# Patient Record
Sex: MALE | Race: OTHER | HISPANIC OR LATINO | Employment: FULL TIME | URBAN - METROPOLITAN AREA
[De-identification: names, ages, dates, MRNs, and addresses within clinical notes are randomized per-mention and may not be internally consistent; named-entity substitution may affect disease eponyms.]

---

## 2021-12-15 ENCOUNTER — APPOINTMENT (EMERGENCY)
Dept: RADIOLOGY | Facility: HOSPITAL | Age: 21
End: 2021-12-15

## 2021-12-15 ENCOUNTER — HOSPITAL ENCOUNTER (EMERGENCY)
Facility: HOSPITAL | Age: 21
Discharge: HOME | End: 2021-12-15
Attending: EMERGENCY MEDICINE
Payer: OTHER MISCELLANEOUS

## 2021-12-15 VITALS
OXYGEN SATURATION: 100 % | HEART RATE: 62 BPM | WEIGHT: 288.8 LBS | RESPIRATION RATE: 16 BRPM | TEMPERATURE: 98.6 F | SYSTOLIC BLOOD PRESSURE: 184 MMHG | DIASTOLIC BLOOD PRESSURE: 77 MMHG

## 2021-12-15 DIAGNOSIS — M79.672 FOOT PAIN, LEFT: Primary | ICD-10-CM

## 2021-12-15 PROCEDURE — 99284 EMERGENCY DEPT VISIT MOD MDM: CPT | Performed by: EMERGENCY MEDICINE

## 2021-12-15 PROCEDURE — 73630 X-RAY EXAM OF FOOT: CPT

## 2021-12-15 PROCEDURE — 99283 EMERGENCY DEPT VISIT LOW MDM: CPT

## 2021-12-15 RX ORDER — IBUPROFEN 600 MG/1
600 TABLET ORAL EVERY 6 HOURS PRN
Qty: 20 TABLET | Refills: 0 | Status: SHIPPED | OUTPATIENT
Start: 2021-12-15

## 2023-03-27 ENCOUNTER — APPOINTMENT (EMERGENCY)
Dept: CT IMAGING | Facility: HOSPITAL | Age: 23
End: 2023-03-27

## 2023-03-27 ENCOUNTER — HOSPITAL ENCOUNTER (EMERGENCY)
Facility: HOSPITAL | Age: 23
Discharge: HOME | End: 2023-03-27
Attending: EMERGENCY MEDICINE | Admitting: EMERGENCY MEDICINE

## 2023-03-27 ENCOUNTER — APPOINTMENT (EMERGENCY)
Dept: ULTRASOUND IMAGING | Facility: HOSPITAL | Age: 23
End: 2023-03-27

## 2023-03-27 VITALS
TEMPERATURE: 97.7 F | SYSTOLIC BLOOD PRESSURE: 117 MMHG | HEART RATE: 58 BPM | RESPIRATION RATE: 16 BRPM | OXYGEN SATURATION: 100 % | WEIGHT: 270.73 LBS | DIASTOLIC BLOOD PRESSURE: 70 MMHG

## 2023-03-27 DIAGNOSIS — R10.9 ABDOMINAL PAIN: Primary | ICD-10-CM

## 2023-03-27 LAB
ALBUMIN SERPL BCP-MCNC: 4.4 G/DL (ref 3.5–5)
ALP SERPL-CCNC: 68 U/L (ref 34–104)
ALT SERPL W P-5'-P-CCNC: 19 U/L (ref 7–52)
ANION GAP SERPL CALCULATED.3IONS-SCNC: 5 MMOL/L (ref 4–13)
AST SERPL W P-5'-P-CCNC: 17 U/L (ref 13–39)
BACTERIA UR QL AUTO: ABNORMAL /HPF
BASOPHILS # BLD AUTO: 0.1 THOUSANDS/ÂΜL (ref 0–0.1)
BASOPHILS NFR BLD AUTO: 1 % (ref 0–1)
BILIRUB SERPL-MCNC: 0.35 MG/DL (ref 0.2–1)
BILIRUB UR QL STRIP: ABNORMAL
BUN SERPL-MCNC: 13 MG/DL (ref 5–25)
CALCIUM SERPL-MCNC: 9.6 MG/DL (ref 8.4–10.2)
CHLORIDE SERPL-SCNC: 106 MMOL/L (ref 96–108)
CLARITY UR: ABNORMAL
CO2 SERPL-SCNC: 26 MMOL/L (ref 21–32)
COLOR UR: YELLOW
CREAT SERPL-MCNC: 0.92 MG/DL (ref 0.6–1.3)
EOSINOPHIL # BLD AUTO: 0.17 THOUSAND/ÂΜL (ref 0–0.61)
EOSINOPHIL NFR BLD AUTO: 2 % (ref 0–6)
ERYTHROCYTE [DISTWIDTH] IN BLOOD BY AUTOMATED COUNT: 12.2 % (ref 11.6–15.1)
GFR SERPL CREATININE-BSD FRML MDRD: 116 ML/MIN/1.73SQ M
GLUCOSE SERPL-MCNC: 95 MG/DL (ref 65–140)
GLUCOSE UR STRIP-MCNC: NEGATIVE MG/DL
HCT VFR BLD AUTO: 47.9 % (ref 36.5–49.3)
HGB BLD-MCNC: 15.4 G/DL (ref 12–17)
HGB UR QL STRIP.AUTO: ABNORMAL
IMM GRANULOCYTES # BLD AUTO: 0.03 THOUSAND/UL (ref 0–0.2)
IMM GRANULOCYTES NFR BLD AUTO: 0 % (ref 0–2)
KETONES UR STRIP-MCNC: NEGATIVE MG/DL
LEUKOCYTE ESTERASE UR QL STRIP: NEGATIVE
LIPASE SERPL-CCNC: 182 U/L (ref 11–82)
LYMPHOCYTES # BLD AUTO: 2.83 THOUSANDS/ÂΜL (ref 0.6–4.47)
LYMPHOCYTES NFR BLD AUTO: 35 % (ref 14–44)
MCH RBC QN AUTO: 28.7 PG (ref 26.8–34.3)
MCHC RBC AUTO-ENTMCNC: 32.2 G/DL (ref 31.4–37.4)
MCV RBC AUTO: 89 FL (ref 82–98)
MONOCYTES # BLD AUTO: 0.58 THOUSAND/ÂΜL (ref 0.17–1.22)
MONOCYTES NFR BLD AUTO: 7 % (ref 4–12)
MUCOUS THREADS UR QL AUTO: ABNORMAL
NEUTROPHILS # BLD AUTO: 4.33 THOUSANDS/ÂΜL (ref 1.85–7.62)
NEUTS SEG NFR BLD AUTO: 55 % (ref 43–75)
NITRITE UR QL STRIP: NEGATIVE
NON-SQ EPI CELLS URNS QL MICRO: ABNORMAL /HPF
NRBC BLD AUTO-RTO: 0 /100 WBCS
PH UR STRIP.AUTO: 5.5 [PH]
PLATELET # BLD AUTO: 216 THOUSANDS/UL (ref 149–390)
PMV BLD AUTO: 11.2 FL (ref 8.9–12.7)
POTASSIUM SERPL-SCNC: 4.1 MMOL/L (ref 3.5–5.3)
PROT SERPL-MCNC: 7.6 G/DL (ref 6.4–8.4)
PROT UR STRIP-MCNC: NEGATIVE MG/DL
RBC # BLD AUTO: 5.37 MILLION/UL (ref 3.88–5.62)
RBC #/AREA URNS AUTO: ABNORMAL /HPF
SARS-COV-2 RNA RESP QL NAA+PROBE: NEGATIVE
SODIUM SERPL-SCNC: 137 MMOL/L (ref 135–147)
SP GR UR STRIP.AUTO: >=1.03 (ref 1–1.03)
UROBILINOGEN UR QL STRIP.AUTO: 0.2 E.U./DL
WBC # BLD AUTO: 8.04 THOUSAND/UL (ref 4.31–10.16)
WBC #/AREA URNS AUTO: ABNORMAL /HPF

## 2023-03-27 RX ORDER — NAPROXEN 500 MG/1
500 TABLET ORAL 2 TIMES DAILY WITH MEALS
Qty: 20 TABLET | Refills: 0 | Status: SHIPPED | OUTPATIENT
Start: 2023-03-27

## 2023-03-27 RX ORDER — KETOROLAC TROMETHAMINE 30 MG/ML
15 INJECTION, SOLUTION INTRAMUSCULAR; INTRAVENOUS ONCE
Status: COMPLETED | OUTPATIENT
Start: 2023-03-27 | End: 2023-03-27

## 2023-03-27 RX ADMIN — IOHEXOL 100 ML: 350 INJECTION, SOLUTION INTRAVENOUS at 19:19

## 2023-03-27 RX ADMIN — KETOROLAC TROMETHAMINE 15 MG: 30 INJECTION, SOLUTION INTRAMUSCULAR; INTRAVENOUS at 19:05

## 2023-03-27 NOTE — ED PROVIDER NOTES
"History  Chief Complaint   Patient presents with   • Abdominal Pain     Patient reports bent over at work yesterday and began having a sharp pain from right scrotum into right lower abdomen  Patient reports stabbing pain in right lower abdomen today  Denies n/v/d     24 y o  M RLQ pain p/w x yesterday  Pt reports he bent over to  a light item (\"less than 1 pound\") and as he stood up, he felt a sharp, intense pain in his right testicle that radiated into his RLQ  Pain was intense and he had to be sent home from work  Pt reports now he's having constant sharp pain to his RLQ, but currently no testicular pain  Denies F/C, N/V/D/C, urinary complaints  History provided by:  Patient   used: No    Abdominal Pain  Pain location:  RLQ  Pain quality: stabbing    Duration:  1 day  Chronicity:  New  Context: not previous surgeries    Ineffective treatments:  None tried  Associated symptoms: no constipation, no diarrhea, no dysuria, no fever, no hematuria, no nausea and no vomiting    Risk factors: has not had multiple surgeries        Prior to Admission Medications   Prescriptions Last Dose Informant Patient Reported? Taking?   ibuprofen (MOTRIN) 600 mg tablet   No No   Sig: Take 1 tablet (600 mg total) by mouth every 6 (six) hours as needed for mild pain      Facility-Administered Medications: None       Past Medical History:   Diagnosis Date   • Cataract     right eye   • Hypertension        History reviewed  No pertinent surgical history  History reviewed  No pertinent family history  I have reviewed and agree with the history as documented      E-Cigarette/Vaping   • E-Cigarette Use Never User      E-Cigarette/Vaping Substances     Social History     Tobacco Use   • Smoking status: Every Day     Packs/day: 0 25     Types: Cigarettes   • Smokeless tobacco: Never   Vaping Use   • Vaping Use: Never used   Substance Use Topics   • Alcohol use: Never   • Drug use: Yes     Types: Marijuana " Review of Systems   Constitutional: Negative for fever  Gastrointestinal: Positive for abdominal pain  Negative for constipation, diarrhea, nausea and vomiting  Genitourinary: Positive for testicular pain (Resolved)  Negative for dysuria, frequency, hematuria and scrotal swelling  All other systems reviewed and are negative  Physical Exam  Physical Exam  Vitals and nursing note reviewed  Constitutional:       General: He is not in acute distress  Appearance: Normal appearance  He is well-developed  He is not ill-appearing, toxic-appearing or diaphoretic  HENT:      Head: Normocephalic and atraumatic  Eyes:      General: No scleral icterus  Neck:      Vascular: No JVD  Trachea: Trachea normal    Cardiovascular:      Rate and Rhythm: Normal rate and regular rhythm  Heart sounds: Normal heart sounds  No murmur heard  No friction rub  Pulmonary:      Effort: Pulmonary effort is normal  No accessory muscle usage or respiratory distress  Breath sounds: Normal breath sounds  No stridor  No wheezing, rhonchi or rales  Abdominal:      General: There is no distension  Palpations: Abdomen is soft  Abdomen is not rigid  There is no mass  Tenderness: There is abdominal tenderness in the right lower quadrant  There is no guarding or rebound  Musculoskeletal:      Cervical back: Normal range of motion  Skin:     General: Skin is warm and dry  Coloration: Skin is not pale  Findings: No rash  Neurological:      Mental Status: He is alert  GCS: GCS eye subscore is 4  GCS verbal subscore is 5  GCS motor subscore is 6     Psychiatric:         Behavior: Behavior normal          Vital Signs  ED Triage Vitals [03/27/23 1554]   Temperature Pulse Respirations Blood Pressure SpO2   97 7 °F (36 5 °C) 61 18 143/90 99 %      Temp Source Heart Rate Source Patient Position - Orthostatic VS BP Location FiO2 (%)   Oral Monitor Sitting Right arm --      Pain Score 5           Vitals:    03/27/23 1554 03/27/23 1913   BP: 143/90 129/79   Pulse: 61 65   Patient Position - Orthostatic VS: Sitting Lying         Visual Acuity      ED Medications  Medications   ketorolac (TORADOL) injection 15 mg (15 mg Intravenous Given 3/27/23 1905)   iohexol (OMNIPAQUE) 350 MG/ML injection (SINGLE-DOSE) 100 mL (100 mL Intravenous Given 3/27/23 1919)       Diagnostic Studies  Results Reviewed     Procedure Component Value Units Date/Time    Urine Microscopic [[id number]]  (Abnormal) Collected: 03/27/23 1636    Lab Status: Final result Specimen: Urine, Clean Catch Updated: 03/27/23 1732     RBC, UA 4-10 /hpf      WBC, UA 2-4 /hpf      Epithelial Cells Occasional /hpf      Bacteria, UA Occasional /hpf      MUCUS THREADS Occasional    Comprehensive metabolic panel [[id number]] Collected: 03/27/23 1635    Lab Status: Final result Specimen: Blood from Arm, Left Updated: 03/27/23 1718     Sodium 137 mmol/L      Potassium 4 1 mmol/L      Chloride 106 mmol/L      CO2 26 mmol/L      ANION GAP 5 mmol/L      BUN 13 mg/dL      Creatinine 0 92 mg/dL      Glucose 95 mg/dL      Calcium 9 6 mg/dL      AST 17 U/L      ALT 19 U/L      Alkaline Phosphatase 68 U/L      Total Protein 7 6 g/dL      Albumin 4 4 g/dL      Total Bilirubin 0 35 mg/dL      eGFR 116 ml/min/1 73sq m     Narrative:      [place] guidelines for Chronic Kidney Disease (CKD):   •  Stage 1 with normal or high GFR (GFR > 90 mL/min/1 73 square meters)  •  Stage 2 Mild CKD (GFR = 60-89 mL/min/1 73 square meters)  •  Stage 3A Moderate CKD (GFR = 45-59 mL/min/1 73 square meters)  •  Stage 3B Moderate CKD (GFR = 30-44 mL/min/1 73 square meters)  •  Stage 4 Severe CKD (GFR = 15-29 mL/min/1 73 square meters)  •  Stage 5 End Stage CKD (GFR <15 mL/min/1 73 square meters)  Note: GFR calculation is accurate only with a steady state creatinine    Lipase [[id number]]  (Abnormal) Collected: 03/27/23 1635    Lab Status: Final result Specimen: Blood from Arm, Left Updated: 03/27/23 1718     Lipase 182 u/L     UA w Reflex to Microscopic w Reflex to Culture [[id number]]  (Abnormal) Collected: 03/27/23 1636    Lab Status: Final result Specimen: Urine, Clean Catch Updated: 03/27/23 1715     Color, UA Yellow     Clarity, UA Slightly Cloudy     Specific Gravity, UA >=1 030     pH, UA 5 5     Leukocytes, UA Negative     Nitrite, UA Negative     Protein, UA Negative mg/dl      Glucose, UA Negative mg/dl      Ketones, UA Negative mg/dl      Urobilinogen, UA 0 2 E U /dl      Bilirubin, UA Small     Occult Blood, UA Large    CBC and differential [[id number]] Collected: 03/27/23 1635    Lab Status: Final result Specimen: Blood from Arm, Left Updated: 03/27/23 1648     WBC 8 04 Thousand/uL      RBC 5 37 Million/uL      Hemoglobin 15 4 g/dL      Hematocrit 47 9 %      MCV 89 fL      MCH 28 7 pg      MCHC 32 2 g/dL      RDW 12 2 %      MPV 11 2 fL      Platelets 765 Thousands/uL      nRBC 0 /100 WBCs      Neutrophils Relative 55 %      Immat GRANS % 0 %      Lymphocytes Relative 35 %      Monocytes Relative 7 %      Eosinophils Relative 2 %      Basophils Relative 1 %      Neutrophils Absolute 4 33 Thousands/µL      Immature Grans Absolute 0 03 Thousand/uL      Lymphocytes Absolute 2 83 Thousands/µL      Monocytes Absolute 0 58 Thousand/µL      Eosinophils Absolute 0 17 Thousand/µL      Basophils Absolute 0 10 Thousands/µL                  US scrotum and testicles   Final Result by Dipika Paz MD (03/27 2059)       Normal        Workstation performed: [id number]         CT abdomen pelvis with contrast   ED Interpretation by Robyn [address], DO (03/27 2023)   Interpreted by me as no obvious abnormality      Final Result by Ilene Townsend MD (03/27 2020)      No acute inflammatory process identified within the abdomen or pelvis              Workstation performed: [id number]                    Procedures  Procedures         ED Course  ED Course as of 03/27/23 2112 Mon Mar 27, 2023   2108 Updated pt on results  Pt feeling better  SBIRT 22yo+    Flowsheet Row Most Recent Value   SBIRT (23 yo +)    In order to provide better care to our patients, we are screening all of our patients for alcohol and drug use  Would it be okay to ask you these screening questions? No Filed at: 03/27/2023 1922                    Medical Decision Making  CT A/P to r/o hernia, abdominal muscle tear, renal stone, appendicitis  Ultrasound to r/o testicular torsion  Amount and/or Complexity of Data Reviewed  Labs: ordered  Radiology: ordered  Risk  Prescription drug management  Disposition  Final diagnoses:   Abdominal pain     Time reflects when diagnosis was documented in both MDM as applicable and the Disposition within this note     Time User Action Codes Description Comment    3/27/2023  8:46 PM Bj Khoury 47 [R10 9] Abdominal pain       ED Disposition     ED Disposition   Discharge    Condition   Stable    Date/Time   Mon Mar 27, 2023  9:08 PM    Comment   Umesh Raymundo 43 discharge to home/self care  Follow-up Information    None         Patient's Medications   Discharge Prescriptions    NAPROXEN (NAPROSYN) 500 MG TABLET    Take 1 tablet (500 mg total) by mouth 2 (two) times a day with meals       Start Date: 3/27/2023 End Date: --       Order Dose: 500 mg       Quantity: 20 tablet    Refills: 0       No discharge procedures on file      PDMP Review     None          ED Provider  Electronically Signed by               Robyn [address], DO  03/27/23 2112

## 2023-07-03 ENCOUNTER — HOSPITAL ENCOUNTER (EMERGENCY)
Facility: HOSPITAL | Age: 23
Discharge: HOME | End: 2023-07-03
Attending: EMERGENCY MEDICINE

## 2023-07-03 VITALS
SYSTOLIC BLOOD PRESSURE: 151 MMHG | WEIGHT: 264.11 LBS | TEMPERATURE: 97.9 F | RESPIRATION RATE: 18 BRPM | OXYGEN SATURATION: 98 % | HEART RATE: 90 BPM | DIASTOLIC BLOOD PRESSURE: 85 MMHG

## 2023-07-03 DIAGNOSIS — M54.31 SCIATICA OF RIGHT SIDE: Primary | ICD-10-CM

## 2023-07-03 DIAGNOSIS — R26.9 GAIT ABNORMALITY: ICD-10-CM

## 2023-07-03 PROCEDURE — 99282 EMERGENCY DEPT VISIT SF MDM: CPT

## 2023-07-03 PROCEDURE — 99284 EMERGENCY DEPT VISIT MOD MDM: CPT | Performed by: INTERNAL MEDICINE

## 2023-07-03 NOTE — Clinical Note
Scarlett Reyes was seen and treated in our emergency department on 7/3/2023. Diagnosis:     Ciprianotta Patient  may return to work on return date. He may return on this date: 07/05/2023         If you have any questions or concerns, please don't hesitate to call.       Arnold Benavides PA-C    ______________________________           _______________          _______________  Hospital Representative                              Date                                Time

## 2023-07-03 NOTE — ED PROVIDER NOTES
History  Chief Complaint   Patient presents with   • Hip Pain     Right sided hip pain x5 days. 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 Pain radiates to lower back and down leg into right ankle. No medication for pain. Referral male presents for evaluation of right hip pain x5 days. Patient states this begins at his right lower back/gluteal region, radiates down his right leg, feels like an "electric shock". No meds PTA, no trauma. No paresthesias, anesthesia, weakness, bowel/bladder incontinence, urinary retention, fever, nausea/vomiting. No IV drug use. Also complains of abnormal gait when walking, gradual onset, states his feet both bow inwards while walking. States he had orthopedic intervention as a child for similar issues. Wants to seek care for this issue. Hip Pain  Associated symptoms: myalgias    Associated symptoms: no abdominal pain, no chest pain, no cough, no ear pain, no fever, no rash, no shortness of breath, no sore throat and no vomiting        Prior to Admission Medications   Prescriptions Last Dose Informant Patient Reported? Taking?   ibuprofen (MOTRIN) 600 mg tablet   No No   Sig: Take 1 tablet (600 mg total) by mouth every 6 (six) hours as needed for mild pain   naproxen (NAPROSYN) 500 mg tablet   No No   Sig: Take 1 tablet (500 mg total) by mouth 2 (two) times a day with meals      Facility-Administered Medications: None       Past Medical History:   Diagnosis Date   • Cataract     right eye   • Hypertension        History reviewed. No pertinent surgical history. History reviewed. No pertinent family history. I have reviewed and agree with the history as documented.     E-Cigarette/Vaping   • E-Cigarette Use Current Every Day User      E-Cigarette/Vaping Substances     Social History     Tobacco Use   • Smoking status: Every Day     Packs/day: 0.25     Types: Cigarettes   • Smokeless tobacco: Never   Vaping Use   • Vaping Use: Every day   Substance Use Topics   • Alcohol use: Yes     Comment: occ   • Drug use: Yes     Types: Marijuana       Review of Systems   Constitutional: Negative for chills and fever. HENT: Negative for ear pain and sore throat. Eyes: Negative for pain and visual disturbance. Respiratory: Negative for cough and shortness of breath. Cardiovascular: Negative for chest pain and palpitations. Gastrointestinal: Negative for abdominal pain and vomiting. Genitourinary: Negative for dysuria and hematuria. Musculoskeletal: Positive for arthralgias and myalgias. Negative for back pain. Skin: Negative for color change and rash. Neurological: Negative for seizures and syncope. All other systems reviewed and are negative. Physical Exam  Physical Exam  Vitals and nursing note reviewed. Constitutional:       General: He is not in acute distress. Appearance: Normal appearance. He is well-developed. HENT:      Head: Normocephalic and atraumatic. Eyes:      Conjunctiva/sclera: Conjunctivae normal.   Cardiovascular:      Rate and Rhythm: Normal rate and regular rhythm. Heart sounds: No murmur heard. Pulmonary:      Effort: Pulmonary effort is normal. No respiratory distress. Breath sounds: Normal breath sounds. Abdominal:      Palpations: Abdomen is soft. Tenderness: There is no abdominal tenderness. Musculoskeletal:         General: No swelling. Cervical back: Normal and neck supple. Thoracic back: Normal.      Lumbar back: Tenderness present. No signs of trauma. Positive right straight leg raise test. Negative left straight leg raise test.        Back:       Comments: Mild tenderness. Skin:     General: Skin is warm and dry. Capillary Refill: Capillary refill takes less than 2 seconds. Neurological:      Mental Status: He is alert.    Psychiatric:         Mood and Affect: Mood normal.         Vital Signs  ED Triage Vitals [07/03/23 1931]   Temperature Pulse Respirations Blood Pressure SpO2   97.9 °F (36.6 °C) 90 18 151/85 98 %      Temp Source Heart Rate Source Patient Position - Orthostatic VS BP Location FiO2 (%)   Oral Monitor Sitting Right arm --      Pain Score       --           Vitals:    07/03/23 1931   BP: 151/85   Pulse: 90   Patient Position - Orthostatic VS: Sitting         Visual Acuity      ED Medications  Medications - No data to display    Diagnostic Studies  Results Reviewed     None                 No orders to display              Procedures  Procedures         ED Course                               SBIRT 20yo+    Flowsheet Row Most Recent Value   Initial Alcohol Screen: US AUDIT-C     1. How often do you have a drink containing alcohol? 0 Filed at: 07/03/2023 1938   2. How many drinks containing alcohol do you have on a typical day you are drinking? 0 Filed at: 07/03/2023 1938   3a. Male UNDER 65: How often do you have five or more drinks on one occasion? 0 Filed at: 07/03/2023 1938   Audit-C Score 0 Filed at: 07/03/2023 1581   DEDRICK: How many times in the past year have you. .. Used an illegal drug or used a prescription medication for non-medical reasons? Never Filed at: 07/03/2023 1938                    Medical Decision Making  26-year-old male presents for evaluation of right-sided low back pain that radiates down his right leg, feels like an electric shock. 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 Exam: Patient in NAD, vitals WNL, mild tenderness over right gluteal region, states his pain is recreated somewhat with right straight leg raise and while sitting on the edge of the bed. Patient's presentation is consistent with right-sided sciatica. No signs or symptoms consistent with spinal abscess, cauda equina. Educated patient thoroughly on supportive care for sciatica including OTC medications, heat, rest; recommended follow-up with orthopedics if not improving as may benefit from physical therapy or imaging.   Also recommended follow-up with orthopedics for patient's complaint of gait abnormality. Disposition  Final diagnoses:   Sciatica of right side   Gait abnormality     Time reflects when diagnosis was documented in both MDM as applicable and the Disposition within this note     Time User Action Codes Description Comment    7/3/2023  8:01 PM Lev Felipajin Add [M54.31] Sciatica of right side     7/3/2023  8:02 PM Lev Felipajin Add [R26.9] Gait abnormality       ED Disposition     ED Disposition   Discharge    Condition   Stable    Date/Time   Mon Jul 3, 2023  8:01 PM    [facility] discharge to home/self care.                Follow-up Information     Follow up With Specialties Details Why Contact Info Additional Information    [address] [facility] [facility] Orthopedic [facility]  As needed [address]  [address] [address]  [facility], [facility] [address] [place], [place], [address] [phone]          Discharge Medication List as of 7/3/2023  8:08 PM      CONTINUE these medications which have NOT CHANGED    Details   ibuprofen (MOTRIN) 600 mg tablet Take 1 tablet (600 mg total) by mouth every 6 (six) hours as needed for mild pain, Starting Wed 12/15/2021, Print      naproxen (NAPROSYN) 500 mg tablet Take 1 tablet (500 mg total) by mouth 2 (two) times a day with meals, Starting Mon 3/27/2023, Normal                 PDMP Review     None          ED Provider  Electronically Signed by           Rober Grant PA-C  07/05/23 0215

## 2023-07-03 NOTE — Clinical Note
Dale Gardner was seen and treated in our emergency department on 7/3/2023. Diagnosis:     Amarilis Blackburn  may return to work on return date. He may return on this date: 07/05/2023         If you have any questions or concerns, please don't hesitate to call.       José Miguel Villegas PA-C    ______________________________           _______________          _______________  Hospital Representative                              Date                                Time

## 2023-07-04 NOTE — DISCHARGE INSTRUCTIONS
Your symptoms are consistent with Sciatica. The best initial treatment includes over-the-counter medications such as aleve and tylenol, rest, and heating pad applied to the affected area. Please start instituting these therapies. If not improving, you can follow up with Orthopedics. They can also help you regarding your concern with abnormal gait while walking.

## 2023-07-30 ENCOUNTER — HOSPITAL ENCOUNTER (EMERGENCY)
Facility: HOSPITAL | Age: 23
Discharge: HOME | End: 2023-07-30
Attending: EMERGENCY MEDICINE

## 2023-07-30 VITALS
TEMPERATURE: 98.5 F | OXYGEN SATURATION: 96 % | SYSTOLIC BLOOD PRESSURE: 152 MMHG | DIASTOLIC BLOOD PRESSURE: 89 MMHG | WEIGHT: 258.6 LBS | RESPIRATION RATE: 19 BRPM | HEART RATE: 119 BPM

## 2023-07-30 DIAGNOSIS — M54.32 SCIATICA OF LEFT SIDE: Primary | ICD-10-CM

## 2023-07-30 PROCEDURE — 99282 EMERGENCY DEPT VISIT SF MDM: CPT

## 2023-07-30 PROCEDURE — 96372 THER/PROPH/DIAG INJ SC/IM: CPT

## 2023-07-30 PROCEDURE — 99284 EMERGENCY DEPT VISIT MOD MDM: CPT

## 2023-07-30 RX ORDER — NAPROXEN 500 MG/1
500 TABLET ORAL 2 TIMES DAILY WITH MEALS
Qty: 30 TABLET | Refills: 0 | Status: SHIPPED | OUTPATIENT
Start: 2023-07-30

## 2023-07-30 RX ORDER — ACETAMINOPHEN 500 MG
500 TABLET ORAL EVERY 6 HOURS PRN
Qty: 30 TABLET | Refills: 0 | Status: SHIPPED | OUTPATIENT
Start: 2023-07-30

## 2023-07-30 RX ORDER — METHOCARBAMOL 500 MG/1
500 TABLET, FILM COATED ORAL 2 TIMES DAILY
Qty: 20 TABLET | Refills: 0 | Status: SHIPPED | OUTPATIENT
Start: 2023-07-30

## 2023-07-30 RX ORDER — KETOROLAC TROMETHAMINE 30 MG/ML
15 INJECTION, SOLUTION INTRAMUSCULAR; INTRAVENOUS ONCE
Status: COMPLETED | OUTPATIENT
Start: 2023-07-30 | End: 2023-07-30

## 2023-07-30 RX ADMIN — KETOROLAC TROMETHAMINE 15 MG: 30 INJECTION, SOLUTION INTRAMUSCULAR; INTRAVENOUS at 18:08

## 2023-07-30 NOTE — Clinical Note
Sarah Weinstein was seen and treated in our emergency department on 7/30/2023. Diagnosis:     Farshad Peters  may return to work on return date. He may return on this date: 07/31/2023         If you have any questions or concerns, please don't hesitate to call.       Valentin Anderson PA-C    ______________________________           _______________          _______________  Hospital Representative                              Date                                Time

## 2023-07-30 NOTE — ED PROVIDER NOTES
History  Chief Complaint   Patient presents with   • Leg Pain     Pt reports pain in buttock radiating down L leg that worsens when pt bends over that began 4 days ago. Denies injury     24 YOM presents with pain that starts in the left buttocks and radiates down the back of his leg x4 days. Feels like an electric shock. Pain is always present and worsens with trying to lift his left leg. Reports taking Tylenol without much relief. Denies paresthesia, anesthesias, weakness, joseluis/bladder incont or retention, fever, chills, nausea vomiting. No IVDA or cancer history. Pt reports he works at [employer] and walks a lot. States he wears Vans at work. Pt was just seen in the ER on 7/3 and diagnosed with right sided sciatica. It was recommended that he follow up with ortho as needed and other supportive measures were discussed           Prior to Admission Medications   Prescriptions Last Dose Informant Patient Reported? Taking?   ibuprofen (MOTRIN) 600 mg tablet   No No   Sig: Take 1 tablet (600 mg total) by mouth every 6 (six) hours as needed for mild pain      Facility-Administered Medications: None       Past Medical History:   Diagnosis Date   • Cataract     right eye   • Hypertension        History reviewed. No pertinent surgical history. History reviewed. No pertinent family history. I have reviewed and agree with the history as documented. E-Cigarette/Vaping   • E-Cigarette Use Current Every Day User      E-Cigarette/Vaping Substances     Social History     Tobacco Use   • Smoking status: Never   • Smokeless tobacco: Never   Vaping Use   • Vaping Use: Every day   Substance Use Topics   • Alcohol use: Yes     Comment: occ   • Drug use: Yes     Types: Marijuana       Review of Systems   Musculoskeletal: Positive for arthralgias. All other systems reviewed and are negative. Physical Exam  Physical Exam  Vitals and nursing note reviewed. Constitutional:       General: He is not in acute distress. Appearance: Normal appearance. He is well-developed. He is not ill-appearing. HENT:      Head: Normocephalic and atraumatic. Eyes:      Conjunctiva/sclera: Conjunctivae normal.   Cardiovascular:      Rate and Rhythm: Normal rate. Pulmonary:      Effort: Pulmonary effort is normal.   Musculoskeletal:         General: Tenderness present. No swelling. Normal range of motion. Cervical back: Normal range of motion and neck supple. Legs:       Comments: TTP in areas noted above. Strength 5/5 in LLE, sensation and pulses intact. + straight leg raise    Skin:     General: Skin is warm and dry. Capillary Refill: Capillary refill takes less than 2 seconds. Neurological:      Mental Status: He is alert. Psychiatric:         Mood and Affect: Mood normal.         Behavior: Behavior normal.         Vital Signs  ED Triage Vitals [07/30/23 1738]   Temperature Pulse Respirations Blood Pressure SpO2   98.5 °F (36.9 °C) (!) 119 19 152/89 96 %      Temp Source Heart Rate Source Patient Position - Orthostatic VS BP Location FiO2 (%)   Oral Monitor Standing Right arm --      Pain Score       9           Vitals:    07/30/23 1738   BP: 152/89   Pulse: (!) 119   Patient Position - Orthostatic VS: Standing         Visual Acuity      ED Medications  Medications   ketorolac (TORADOL) injection 15 mg (15 mg Intramuscular Given 7/30/23 1808)       Diagnostic Studies  Results Reviewed     None                 No orders to display              Procedures  Procedures         ED Course                               SBIRT 20yo+    Flowsheet Row Most Recent Value   Initial Alcohol Screen: US AUDIT-C     1. How often do you have a drink containing alcohol? 0 Filed at: 07/30/2023 1805   2. How many drinks containing alcohol do you have on a typical day you are drinking? 0 Filed at: 07/30/2023 1805   3a. Male UNDER 65: How often do you have five or more drinks on one occasion?  0 Filed at: 07/30/2023 1805   Audit-C Score 0 Filed at: 07/30/2023 1809   DEDRICK: How many times in the past year have you. .. Used an illegal drug or used a prescription medication for non-medical reasons? Never Filed at: 07/30/2023 1805                    Medical Decision Making  21-year-old male presents for evaluation of left sided LE pain that starts in left buttocks and radiates down left leg, feels like an electric shock. 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 Exam: Patient in NAD, vitals WNL, mild tenderness over right gluteal region, states his pain is recreated with straight leg raise on the left. Pulses, sensation and ROM all intact.      Patient's presentation is consistent with left-sided sciatica. No signs or symptoms consistent with spinal abscess, cauda equina. Educated patient thoroughly on supportive care for sciatica including OTC medications, supportive shoes, heat, rest; recommended follow-up with orthopedics if not improving as may benefit from physical therapy or imaging. I have discussed the plan to discharge pt from ED. The patient was discharged in stable condition.  Patient ambulated off the department.  Extensive return to emergency department precautions were discussed.  Follow up with appropriate providers including primary care physician was discussed.  Patient and/or their  primary decision maker expressed understanding. Nunez Members remained stable during entire emergency department stay. Sciatica of left side: acute illness or injury  Risk  OTC drugs. Prescription drug management.           Disposition  Final diagnoses:   Sciatica of left side     Time reflects when diagnosis was documented in both MDM as applicable and the Disposition within this note     Time User Action Codes Description Comment    7/30/2023  6:03 PM Alexandrea Cuevas Add [I43.750] Left leg pain     7/30/2023  6:04 PM Alexandrea Cuevas Add [M54.32] Sciatica of left side     7/30/2023  6:04 PM Alexandrea Cuevas Modify [M54.32] Sciatica of left side 7/30/2023  6:04 PM Esteban Weiss [M79.605] Left leg pain       ED Disposition     ED Disposition   Discharge    Condition   Stable    Date/Time   Sun Jul 30, 2023  6:13 PM    [address] [place] [address] discharge to home/self care. Follow-up Information     Follow up With Specialties Details Why Contact Info Additional Information    [facility] Orthopedic Surgery  As needed [address]  [address] [address]  [facility], [facility] [address], [address], [place], [address] [phone]          Patient's Medications   Discharge Prescriptions    ACETAMINOPHEN (TYLENOL) 500 MG TABLET    Take 1 tablet (500 mg total) by mouth every 6 (six) hours as needed for mild pain       Start Date: 7/30/2023 End Date: --       Order Dose: 500 mg       Quantity: 30 tablet    Refills: 0    METHOCARBAMOL (ROBAXIN) 500 MG TABLET    Take 1 tablet (500 mg total) by mouth 2 (two) times a day       Start Date: 7/30/2023 End Date: --       Order Dose: 500 mg       Quantity: 20 tablet    Refills: 0    NAPROXEN (NAPROSYN) 500 MG TABLET    Take 1 tablet (500 mg total) by mouth 2 (two) times a day with meals       Start Date: 7/30/2023 End Date: --       Order Dose: 500 mg       Quantity: 30 tablet    Refills: 0       No discharge procedures on file.     PDMP Review     None          ED Provider  Electronically Signed by           Rojelio Shaffer PA-C  07/30/23 201 [facility] CATRACHITA Gerardo  07/30/23 [id number]

## 2023-07-30 NOTE — DISCHARGE INSTRUCTIONS
-take naprosyn twice per day. This is an NSAID like ibuprofen so do not take ibuprofen with it  -take tylenol every 6 hours as needed  -take robaxin every 12 hours as needed. This is a muscle relaxer and can make you sleepy. Do not drive or operate heavy machinery after taking it.  Do not drink alcohol while taking it  -follow up with ortho as needed

## 2023-09-22 ENCOUNTER — CONSULT (OUTPATIENT)
Dept: PAIN MEDICINE | Facility: CLINIC | Age: 23
End: 2023-09-22
Payer: MEDICARE

## 2023-09-22 VITALS — WEIGHT: 258 LBS | HEIGHT: 75 IN | BODY MASS INDEX: 32.08 KG/M2

## 2023-09-22 DIAGNOSIS — M54.16 LUMBAR RADICULOPATHY: ICD-10-CM

## 2023-09-22 PROCEDURE — 99204 OFFICE O/P NEW MOD 45 MIN: CPT | Performed by: ANESTHESIOLOGY

## 2023-09-22 NOTE — PROGRESS NOTES
Assessment  1. Lumbar radiculopathy      Plan    Patient presenting with chronic back pain for 2+ months. Pain is consistent with lumbar radicular pain accompanied by pain >7/10 on the pain scale with inability to participate in IADLs for >6 weeks. Has been taking naproxen, Tylenol, Robaxin with modest benefit. Denies any bowel or bladder incontinence, saddle anesthesia. Patient has presented to the ED on 2 occasions for these symptoms. - discussed at this time that as his symptoms have improved since initial onset, would recommend starting physical therapy trial for flexibility and core strengthening, education on how to prevent future injury (patient does work in a warehouse with physically demanding duties)    - patient should f/u in 2 months after PT for reevaluation. If symptoms persist or worsen will proceed with lumbar MRI    Reviewed external notes from the relevant aspects of the patient's medical record, specifically emergency department notes in regards to current and prior treatments tried (as mentioned in history of present illness). Reviewed pertinent laboratory studies, specifically renal function, hemoglobin A1c, CBC, coagulation studies, prior to recommending medication therapies/interventional treatment options. [place] Prescription Drug Monitoring Program report was reviewed and was appropriate     My impressions and treatment recommendations were discussed in detail with the patient who verbalized understanding and had no further questions. Discharge instructions were provided. I personally saw and examined the patient and I agree with the above discussed plan of care.     Orders Placed This Encounter   Procedures   • Ambulatory referral to Physical Therapy     Standing Status:   Future     Standing Expiration Date:   9/22/2024     Referral Priority:   Routine     Referral Type:   Physical Therapy     Referral Reason:   Specialty Services Required     Requested Specialty: Physical Therapy     Number of Visits Requested:   1     Expiration Date:   9/22/2024     No orders of the defined types were placed in this encounter. History of Present Illness    Nayeli Gtz is a 24 y.o. male presenting for new patient visit at [facility] for exam and evaluation of acute back and radicular leg pain for 2+ months. Pain started without any precipitating injury or trauma. Over the past month, the intensity of pain has been Moderate. Pain is currently 4/10. Pain does interfere with age appropriate activities of daily living. Pain is nearly constant, worse in the morning. Pain is described as shooting, sharp, pressure-like. Patient endorses weakness in the lower extremities. Assistance device used: None. Pain is increased with standing, bending, sitting, walking, exercise, coughing, sneezing. Pain is decreased with lying down, rest.    Prior pertinent treatments tried: none. Pertinent medications tried/currently taking: tylenol, ibuprofen, Robaxin    I have personally reviewed and/or updated the patient's past medical history, past surgical history, family history, social history, current medications, allergies, and vital signs today. Review of Systems   Constitutional: Negative for chills and fever. HENT: Negative for ear pain and sore throat. Eyes: Negative for pain and visual disturbance. Respiratory: Negative for cough and shortness of breath. Cardiovascular: Negative for chest pain and palpitations. Gastrointestinal: Negative for abdominal pain and vomiting. Genitourinary: Negative for dysuria and hematuria. Musculoskeletal: Positive for back pain, gait problem and myalgias. Negative for arthralgias. Skin: Negative for color change and rash. Neurological: Negative for seizures and syncope. All other systems reviewed and are negative. There is no problem list on file for this patient.       Past Medical History:   Diagnosis Date   • Cataract     right eye   • Hypertension        History reviewed. No pertinent surgical history. History reviewed. No pertinent family history. Social History     Occupational History   • Not on file   Tobacco Use   • Smoking status: Never   • Smokeless tobacco: Never   Vaping Use   • Vaping Use: Every day   Substance and Sexual Activity   • Alcohol use: Yes     Comment: occ   • Drug use: Yes     Types: Marijuana   • Sexual activity: Not on file       Current Outpatient Medications on File Prior to Visit   Medication Sig   • acetaminophen (TYLENOL) 500 mg tablet Take 1 tablet (500 mg total) by mouth every 6 (six) hours as needed for mild pain (Patient not taking: Reported on 9/22/2023)   • methocarbamol (ROBAXIN) 500 mg tablet Take 1 tablet (500 mg total) by mouth 2 (two) times a day (Patient not taking: Reported on 9/22/2023)   • naproxen (Naprosyn) 500 mg tablet Take 1 tablet (500 mg total) by mouth 2 (two) times a day with meals (Patient not taking: Reported on 9/22/2023)     No current facility-administered medications on file prior to visit. No Known Allergies    Physical Exam    Ht 6' 3" (1.905 m)   Wt 117 kg (258 lb)   BMI 32.25 kg/m²     Constitutional: normal, well developed, well nourished, alert, in no distress and non-toxic and no overt pain behavior.   Eyes: anicteric  HEENT: grossly intact  Neck: supple, symmetric, trachea midline and no masses   Pulmonary:even and unlabored  Cardiovascular:No edema or pitting edema present  Skin:Normal without rashes or lesions and well hydrated  Psychiatric:Mood and affect appropriate  Neurologic: Motor function is grossly intact, no focal neurologic deficits  Musculoskeletal: Gait is nonantalgic    Imaging

## 2024-01-18 ENCOUNTER — HOSPITAL ENCOUNTER (EMERGENCY)
Facility: HOSPITAL | Age: 24
Discharge: HOME | End: 2024-01-18
Attending: EMERGENCY MEDICINE
Payer: MEDICARE

## 2024-01-18 VITALS
WEIGHT: 268.96 LBS | HEART RATE: 79 BPM | OXYGEN SATURATION: 100 % | TEMPERATURE: 98.5 F | SYSTOLIC BLOOD PRESSURE: 159 MMHG | DIASTOLIC BLOOD PRESSURE: 94 MMHG | BODY MASS INDEX: 33.62 KG/M2 | RESPIRATION RATE: 18 BRPM

## 2024-01-18 DIAGNOSIS — Z20.822 ENCOUNTER FOR LABORATORY TESTING FOR COVID-19 VIRUS: Primary | ICD-10-CM

## 2024-01-18 LAB
FLUAV RNA RESP QL NAA+PROBE: NEGATIVE
FLUBV RNA RESP QL NAA+PROBE: NEGATIVE
RSV RNA RESP QL NAA+PROBE: NEGATIVE
SARS-COV-2 RNA RESP QL NAA+PROBE: NEGATIVE

## 2024-01-18 PROCEDURE — 0241U HB NFCT DS VIR RESP RNA 4 TRGT: CPT

## 2024-01-18 PROCEDURE — 99283 EMERGENCY DEPT VISIT LOW MDM: CPT

## 2024-01-18 NOTE — Clinical Note
Calvin Conrad was seen and treated in our emergency department on 1/18/2024.                Diagnosis:     Calvin  .    He may return on this date:     Pending COVID/flu/RSV result.     If you have any questions or concerns, please don't hesitate to call.      Rey Alamo PA-C    ______________________________           _______________          _______________  Hospital Representative                              Date                                Time

## 2024-01-18 NOTE — DISCHARGE INSTRUCTIONS
Please return to the emergency department for any concerns as outlined in the after visit summary or for any other concerns.    Please follow-up with your primary care provider in 2 days for re-evaluation and further management.    Follow-up with your COVID/flu/RSV results on MyChart.    Continue ibuprofen or acetaminophen as needed pain control.

## 2024-01-18 NOTE — ED PROVIDER NOTES
History  Chief Complaint   Patient presents with    COVID-19 Swab Only     States cold/ flu symptoms for 4 days, was told to get a covid/ flu test and note work.      23-year-old male with no reported past medical history presenting to the ED with a request for a COVID/flu test and a work note.  States he began with a headache, runny nose, nausea and bodyaches about 4 days ago.  States overall his symptoms have significantly improved.  He does have somewhat of a mild sore throat and continues with the rhinorrhea.  His S/O at bedside had similar symptoms and was diagnosed with influenza.  No associated cough, ear complaints, body/muscle aches, shortness of breath, chest pain, abdominal pain, N/V/D, urinary complaints, headache, neck pain, rash, weakness or any other specific complaint.          Prior to Admission Medications   Prescriptions Last Dose Informant Patient Reported? Taking?   acetaminophen (TYLENOL) 500 mg tablet  Self No No   Sig: Take 1 tablet (500 mg total) by mouth every 6 (six) hours as needed for mild pain   Patient not taking: Reported on 9/22/2023   methocarbamol (ROBAXIN) 500 mg tablet  Self No No   Sig: Take 1 tablet (500 mg total) by mouth 2 (two) times a day   Patient not taking: Reported on 9/22/2023   naproxen (Naprosyn) 500 mg tablet  Self No No   Sig: Take 1 tablet (500 mg total) by mouth 2 (two) times a day with meals   Patient not taking: Reported on 9/22/2023      Facility-Administered Medications: None       Past Medical History:   Diagnosis Date    Cataract     right eye    Hypertension        History reviewed. No pertinent surgical history.    History reviewed. No pertinent family history.  I have reviewed and agree with the history as documented.    E-Cigarette/Vaping    E-Cigarette Use Current Every Day User      E-Cigarette/Vaping Substances     Social History     Tobacco Use    Smoking status: Never    Smokeless tobacco: Never   Vaping Use    Vaping status: Every Day   Substance  Use Topics    Alcohol use: Yes     Comment: occ    Drug use: Yes     Types: Marijuana       Review of Systems   HENT:  Positive for rhinorrhea and sore throat.    Gastrointestinal:  Positive for nausea (Resolved).   Musculoskeletal:  Positive for myalgias (Resolved).   Neurological:  Positive for headaches (Resolved).       Physical Exam  Physical Exam  Vitals and nursing note reviewed.   Constitutional:       General: He is not in acute distress.     Appearance: He is well-developed.      Comments: Awake, alert, interactive and resting in the stretcher in no acute distress.  Patient is not ill or toxic appearing.     HENT:      Head: Normocephalic and atraumatic.      Nose: Nose normal.      Mouth/Throat:      Comments: MMM.  Oropharynx patent and clear.  No erythema, exudates, lesions, sores or unilateral swelling.  Eyes:      Conjunctiva/sclera: Conjunctivae normal.   Cardiovascular:      Rate and Rhythm: Normal rate and regular rhythm.      Heart sounds: No murmur heard.  Pulmonary:      Effort: Pulmonary effort is normal. No respiratory distress.      Breath sounds: Normal breath sounds.   Abdominal:      Palpations: Abdomen is soft.      Tenderness: There is no abdominal tenderness.   Musculoskeletal:         General: No swelling.      Cervical back: Neck supple. No rigidity.      Right lower leg: No edema.      Left lower leg: No edema.   Lymphadenopathy:      Cervical: No cervical adenopathy.   Skin:     General: Skin is warm and dry.      Capillary Refill: Capillary refill takes less than 2 seconds.   Neurological:      Mental Status: He is alert.   Psychiatric:         Mood and Affect: Mood normal.         Vital Signs  ED Triage Vitals [01/18/24 0201]   Temperature Pulse Respirations Blood Pressure SpO2   98.5 °F (36.9 °C) 79 18 159/94 100 %      Temp Source Heart Rate Source Patient Position - Orthostatic VS BP Location FiO2 (%)   Oral Monitor Sitting Right arm --      Pain Score       --      "      Vitals:    01/18/24 0201   BP: 159/94   Pulse: 79   Patient Position - Orthostatic VS: Sitting         Visual Acuity      ED Medications  Medications - No data to display    Diagnostic Studies  Results Reviewed       Procedure Component Value Units Date/Time    FLU/RSV/COVID - if FLU/RSV clinically relevant [[id number]] Collected: 01/18/24 0251    Lab Status: In process Specimen: Nares from Nose Updated: 01/18/24 0254                   No orders to display              Procedures  Procedures         ED Course                                             Medical Decision Making  DDx include but limited to URI, viral illness, COVID, influenza, RSV.  Doubt strep pharyngitis.   Centor- 1. Will defer further strep testing.  Will test patient for COVID/flu/RSV.  Will provide work note pending these results.  Do not believe patient requires further labs, imaging or workup at this time.  Patient agreeable.  Other supportive care and follow-up as outlined in AVS and discussed with patient prior to discharge.  Strict return precautions verbally communicated to the patient as outlined in the AVS.  All patient questions and concerns were answered.  Patient verbally communicated their understanding and agreement to the above plan.  Patient stable at discharge.       Portions of the record may have been created with voice recognition software. Occasional wrong word or \"sound a like\" substitutions may have occurred due to the inherent limitations of voice recognition software. Read the chart carefully and recognize, using context, where substitutions have occurred.               Disposition  Final diagnoses:   Encounter for laboratory testing for COVID-19 virus     Time reflects when diagnosis was documented in both MDM as applicable and the Disposition within this note       Time User Action Codes Description Comment    1/18/2024  2:52 AM Rey Alamo Add [Z20.822] Encounter for laboratory testing for COVID-19 virus       "     ED Disposition       ED Disposition   Discharge    Condition   Stable    Date/Time   Thu Jan 18, 2024  2:03 AM    Comment   Calvin Conrad discharge to home/self care.                   Follow-up Information       Follow up With Specialties Details Why Contact Info Additional Information    [facility] Emergency Department Emergency Medicine Go to  As needed, If symptoms worsen [address] [facility] [address]  [phone] [facility] Emergency Department, [address] [place], [place], [address]            Patient's Medications   Discharge Prescriptions    No medications on file       No discharge procedures on file.    PDMP Review         Value Time User    PDMP Reviewed  Yes 9/22/2023 11:32 AM Will Jong Up MD            ED Provider  Electronically Signed by             Rey Alamo PA-C  01/18/24 0309

## 2025-01-09 ENCOUNTER — HOSPITAL ENCOUNTER (EMERGENCY)
Facility: HOSPITAL | Age: 25
Discharge: HOME | End: 2025-01-09
Attending: EMERGENCY MEDICINE
Payer: MEDICARE

## 2025-01-09 VITALS
HEART RATE: 89 BPM | RESPIRATION RATE: 18 BRPM | SYSTOLIC BLOOD PRESSURE: 150 MMHG | DIASTOLIC BLOOD PRESSURE: 70 MMHG | OXYGEN SATURATION: 95 % | TEMPERATURE: 98.1 F | WEIGHT: 262.79 LBS | BODY MASS INDEX: 32.85 KG/M2

## 2025-01-09 DIAGNOSIS — L02.31 ABSCESS OF BUTTOCK, RIGHT: Primary | ICD-10-CM

## 2025-01-09 PROCEDURE — 99284 EMERGENCY DEPT VISIT MOD MDM: CPT | Performed by: EMERGENCY MEDICINE

## 2025-01-09 PROCEDURE — 99282 EMERGENCY DEPT VISIT SF MDM: CPT

## 2025-01-09 PROCEDURE — 87077 CULTURE AEROBIC IDENTIFY: CPT | Performed by: EMERGENCY MEDICINE

## 2025-01-09 PROCEDURE — 87070 CULTURE OTHR SPECIMN AEROBIC: CPT | Performed by: EMERGENCY MEDICINE

## 2025-01-09 PROCEDURE — 10060 I&D ABSCESS SIMPLE/SINGLE: CPT | Performed by: EMERGENCY MEDICINE

## 2025-01-09 PROCEDURE — 87205 SMEAR GRAM STAIN: CPT | Performed by: EMERGENCY MEDICINE

## 2025-01-09 RX ORDER — CLINDAMYCIN HYDROCHLORIDE 150 MG/1
450 CAPSULE ORAL 3 TIMES DAILY
Qty: 63 CAPSULE | Refills: 0 | Status: SHIPPED | OUTPATIENT
Start: 2025-01-09 | End: 2025-01-16

## 2025-01-09 RX ORDER — NAPROXEN 500 MG/1
500 TABLET ORAL 2 TIMES DAILY WITH MEALS
Qty: 10 TABLET | Refills: 0 | Status: SHIPPED | OUTPATIENT
Start: 2025-01-09 | End: 2025-01-14

## 2025-01-09 RX ORDER — LIDOCAINE HYDROCHLORIDE AND EPINEPHRINE 10; 10 MG/ML; UG/ML
20 INJECTION, SOLUTION INFILTRATION; PERINEURAL ONCE
Status: COMPLETED | OUTPATIENT
Start: 2025-01-09 | End: 2025-01-09

## 2025-01-09 RX ADMIN — LIDOCAINE HYDROCHLORIDE,EPINEPHRINE BITARTRATE 20 ML: 10; .01 INJECTION, SOLUTION INFILTRATION; PERINEURAL at 08:57

## 2025-01-09 NOTE — Clinical Note
Calvin Conrad was seen and treated in our emergency department on 1/9/2025.                Diagnosis:     Calvin  may return to work on return date.    He may return on this date: 01/11/2025         If you have any questions or concerns, please don't hesitate to call.      Ron Johnson MD    ______________________________           _______________          _______________  Hospital Representative                              Date                                Time

## 2025-01-09 NOTE — ED PROVIDER NOTES
Time reflects when diagnosis was documented in both MDM as applicable and the Disposition within this note       Time User Action Codes Description Comment    1/9/2025  8:54 AM AlaRon serna Add [L02.91] Abscess     1/9/2025  8:54 AM AlaNoam sernaRon Add [L02.31] Abscess of buttock, right     1/9/2025  8:54 AM Alam, Ron Modify [L02.31] Abscess of buttock, right     1/9/2025  8:54 AM AlamNoamRon Remove [L02.91] Abscess           ED Disposition       ED Disposition   Discharge    Condition   Stable    Date/Time   Thu Jan 9, 2025  9:21 AM    Comment   Calvin Conrad discharge to home/self care.                   Assessment & Plan       Medical Decision Making  Patient is a 24-year-old male, comes in with complaints of abscess to the right buttock, patient states that he has hx of small abscesses that did not not require excision in the past, patient denies fever, chills, no known medical problems.  On exam, patient is well-appearing, no acute distress, vital signs are stable, right buttock abscess with induration and erythema noted.  Impression: Right buttock abscess, we will do I&D, put on antibiotics due to surrounding erythema.    Problems Addressed:  Abscess of buttock, right: acute illness or injury    Amount and/or Complexity of Data Reviewed  Labs: ordered.    Risk  Prescription drug management.             Medications   lidocaine-epinephrine (XYLOCAINE/EPINEPHRINE) 1 %-1:100,000 injection 20 mL (20 mL Infiltration Given 1/9/25 0857)       ED Risk Strat Scores                          SBIRT 22yo+      Flowsheet Row Most Recent Value   Initial Alcohol Screen: US AUDIT-C     1. How often do you have a drink containing alcohol? 1 Filed at: 01/09/2025 0829   2. How many drinks containing alcohol do you have on a typical day you are drinking?  1 Filed at: 01/09/2025 0829   3a. Male UNDER 65: How often do you have five or more drinks on one occasion? 0 Filed at: 01/09/2025 0829   3b. FEMALE Any Age, or MALE 65+:  How often do you have 4 or more drinks on one occassion? 0 Filed at: 01/09/2025 0829   Audit-C Score 2 Filed at: 01/09/2025 0829   DEDRICK: How many times in the past year have you...    Used an illegal drug or used a prescription medication for non-medical reasons? Never Filed at: 01/09/2025 0829                            History of Present Illness       Chief Complaint   Patient presents with    Abscess     R buttock, noted a few days ago.       Past Medical History:   Diagnosis Date    Cataract     right eye    Hypertension       History reviewed. No pertinent surgical history.   History reviewed. No pertinent family history.   Social History     Tobacco Use    Smoking status: Never    Smokeless tobacco: Never   Vaping Use    Vaping status: Every Day   Substance Use Topics    Alcohol use: Yes     Comment: occ    Drug use: Yes     Types: Marijuana      E-Cigarette/Vaping    E-Cigarette Use Current Every Day User       E-Cigarette/Vaping Substances      I have reviewed and agree with the history as documented.       History provided by:  Patient   used: No    Abscess  Location:  Pelvis  Pelvic abscess location:  R buttock  Size:  2 x 2 cm  Abscess quality: induration and redness    Red streaking: no    Duration:  3 days  Progression:  Worsening  Chronicity:  Recurrent  Context: not diabetes    Relieved by:  Nothing  Worsened by:  Nothing  Ineffective treatments:  None tried  Associated symptoms: no fever, no headaches, no nausea and no vomiting    Risk factors: prior abscess        Review of Systems   Constitutional:  Negative for chills and fever.   HENT:  Negative for facial swelling, sore throat and trouble swallowing.    Eyes:  Negative for pain and visual disturbance.   Respiratory:  Negative for cough, chest tightness and shortness of breath.    Cardiovascular:  Negative for chest pain and leg swelling.   Gastrointestinal:  Negative for abdominal pain, diarrhea, nausea and vomiting.    Genitourinary:  Negative for dysuria and flank pain.        Buttock abscess   Musculoskeletal:  Negative for back pain, neck pain and neck stiffness.   Skin:  Negative for pallor and rash.   Allergic/Immunologic: Negative for environmental allergies and immunocompromised state.   Neurological:  Negative for dizziness and headaches.   Hematological:  Negative for adenopathy. Does not bruise/bleed easily.   Psychiatric/Behavioral:  Negative for agitation and behavioral problems.    All other systems reviewed and are negative.          Objective       ED Triage Vitals [01/09/25 0807]   Temperature Pulse Blood Pressure Respirations SpO2 Patient Position - Orthostatic VS   98.1 °F (36.7 °C) 89 150/70 18 95 % Standing      Temp Source Heart Rate Source BP Location FiO2 (%) Pain Score    Oral -- Right arm -- 8      Vitals      Date and Time Temp Pulse SpO2 Resp BP Pain Score FACES Pain Rating User   01/09/25 0807 98.1 °F (36.7 °C) 89 95 % 18 150/70 8 -- NG            Physical Exam  Vitals and nursing note reviewed.   Constitutional:       General: He is not in acute distress.     Appearance: He is well-developed.   HENT:      Head: Normocephalic and atraumatic.   Eyes:      Extraocular Movements: Extraocular movements intact.   Cardiovascular:      Rate and Rhythm: Normal rate and regular rhythm.   Pulmonary:      Effort: Pulmonary effort is normal. No respiratory distress.   Abdominal:      Palpations: Abdomen is soft.      Tenderness: There is no abdominal tenderness. There is no guarding or rebound.   Genitourinary:     Comments: Abscess at right buttock, mild induration  Musculoskeletal:         General: Normal range of motion.      Cervical back: Normal range of motion and neck supple.   Skin:     General: Skin is warm and dry.   Neurological:      General: No focal deficit present.      Mental Status: He is alert and oriented to person, place, and time.   Psychiatric:         Mood and Affect: Mood normal.          Behavior: Behavior normal.         Results Reviewed       Procedure Component Value Units Date/Time    Wound culture and Gram stain [[id number]]     Lab Status: No result Specimen: Wound from Buttock             No orders to display       Incision and drain    Date/Time: 1/9/2025 9:20 AM    Performed by: Ron Johnson MD  Authorized by: Ron Johnson MD  [facility] Protocol:  procedure performed by consultantConsent: Verbal consent obtained.  Consent given by: patient  Patient understanding: patient states understanding of the procedure being performed  Patient identity confirmed: verbally with patient    Patient location:  ED  Location:     Type:  Abscess    Size:  2 x 2 cm    Location:  Lower extremity    Lower extremity location:  R buttock  Pre-procedure details:     Skin preparation:  Chloraprep  Anesthesia (see MAR for exact dosages):     Anesthesia method:  Local infiltration    Local anesthetic:  Lidocaine 1% WITH epi  Procedure details:     Complexity:  Simple    Needle aspiration: no      Incision types:  Stab incision    Scalpel blade:  11    Approach:  Open    Incision depth:  Subcutaneous    Drainage:  Purulent    Drainage amount:  Moderate    Wound treatment:  Wound left open    Packing materials:  None  Post-procedure details:     Patient tolerance of procedure:  Tolerated well, no immediate complications      ED Medication and Procedure Management   Prior to Admission Medications   Prescriptions Last Dose Informant Patient Reported? Taking?   acetaminophen (TYLENOL) 500 mg tablet Not Taking Self No No   Sig: Take 1 tablet (500 mg total) by mouth every 6 (six) hours as needed for mild pain   Patient not taking: Reported on 9/22/2023   methocarbamol (ROBAXIN) 500 mg tablet Not Taking Self No No   Sig: Take 1 tablet (500 mg total) by mouth 2 (two) times a day   Patient not taking: Reported on 9/22/2023   naproxen (Naprosyn) 500 mg tablet Not Taking Self No No   Sig: Take 1 tablet (500 mg total) by mouth 2  (two) times a day with meals   Patient not taking: Reported on 9/22/2023      Facility-Administered Medications: None     Patient's Medications   Discharge Prescriptions    CLINDAMYCIN (CLEOCIN) 150 MG CAPSULE    Take 3 capsules (450 mg total) by mouth 3 (three) times a day for 7 days       Start Date: 1/9/2025  End Date: 1/16/2025       Order Dose: 450 mg       Quantity: 63 capsule    Refills: 0    NAPROXEN (NAPROSYN) 500 MG TABLET    Take 1 tablet (500 mg total) by mouth 2 (two) times a day with meals for 5 days       Start Date: 1/9/2025  End Date: 1/14/2025       Order Dose: 500 mg       Quantity: 10 tablet    Refills: 0     No discharge procedures on file.  ED SEPSIS DOCUMENTATION   Time reflects when diagnosis was documented in both MDM as applicable and the Disposition within this note       Time User Action Codes Description Comment    1/9/2025  8:54 AM Ron Johnson Add [L02.91] Abscess     1/9/2025  8:54 AM Ron Johnson Add [L02.31] Abscess of buttock, right     1/9/2025  8:54 AM Ron Johnson Modify [L02.31] Abscess of buttock, right     1/9/2025  8:54 AM Ron Johnson Remove [L02.91] Abscess                  Ron Johnson MD  01/09/25 6382

## 2025-01-12 LAB
BACTERIA WND AEROBE CULT: ABNORMAL
BACTERIA WND AEROBE CULT: ABNORMAL
GRAM STN SPEC: ABNORMAL

## 2025-03-06 ENCOUNTER — HOSPITAL ENCOUNTER (EMERGENCY)
Facility: HOSPITAL | Age: 25
Discharge: HOME | End: 2025-03-06
Attending: EMERGENCY MEDICINE
Payer: MEDICARE

## 2025-03-06 ENCOUNTER — APPOINTMENT (EMERGENCY)
Dept: CT IMAGING | Facility: HOSPITAL | Age: 25
End: 2025-03-06
Payer: MEDICARE

## 2025-03-06 VITALS
TEMPERATURE: 97.6 F | RESPIRATION RATE: 18 BRPM | HEART RATE: 60 BPM | WEIGHT: 260.14 LBS | SYSTOLIC BLOOD PRESSURE: 167 MMHG | BODY MASS INDEX: 32.52 KG/M2 | OXYGEN SATURATION: 98 % | DIASTOLIC BLOOD PRESSURE: 75 MMHG

## 2025-03-06 DIAGNOSIS — G89.29 ACUTE EXACERBATION OF CHRONIC LOW BACK PAIN: Primary | ICD-10-CM

## 2025-03-06 DIAGNOSIS — M54.50 ACUTE EXACERBATION OF CHRONIC LOW BACK PAIN: Primary | ICD-10-CM

## 2025-03-06 LAB
BACTERIA UR QL AUTO: ABNORMAL /HPF
BILIRUB UR QL STRIP: NEGATIVE
CLARITY UR: CLEAR
COLOR UR: YELLOW
GLUCOSE UR STRIP-MCNC: NEGATIVE MG/DL
HGB UR QL STRIP.AUTO: ABNORMAL
KETONES UR STRIP-MCNC: ABNORMAL MG/DL
LEUKOCYTE ESTERASE UR QL STRIP: NEGATIVE
MUCOUS THREADS UR QL AUTO: ABNORMAL
NITRITE UR QL STRIP: NEGATIVE
NON-SQ EPI CELLS URNS QL MICRO: ABNORMAL /HPF
PH UR STRIP.AUTO: 5.5 [PH] (ref 4.5–8)
PROT UR STRIP-MCNC: NEGATIVE MG/DL
RBC #/AREA URNS AUTO: ABNORMAL /HPF
SP GR UR STRIP.AUTO: >=1.03 (ref 1–1.03)
UROBILINOGEN UR QL STRIP.AUTO: 0.2 E.U./DL
WBC #/AREA URNS AUTO: ABNORMAL /HPF

## 2025-03-06 PROCEDURE — 74176 CT ABD & PELVIS W/O CONTRAST: CPT

## 2025-03-06 PROCEDURE — 81001 URINALYSIS AUTO W/SCOPE: CPT

## 2025-03-06 PROCEDURE — 96372 THER/PROPH/DIAG INJ SC/IM: CPT

## 2025-03-06 PROCEDURE — 99284 EMERGENCY DEPT VISIT MOD MDM: CPT | Performed by: EMERGENCY MEDICINE

## 2025-03-06 PROCEDURE — 99284 EMERGENCY DEPT VISIT MOD MDM: CPT

## 2025-03-06 RX ORDER — KETOROLAC TROMETHAMINE 10 MG/1
10 TABLET, FILM COATED ORAL EVERY 6 HOURS PRN
Qty: 20 TABLET | Refills: 0 | Status: SHIPPED | OUTPATIENT
Start: 2025-03-06 | End: 2025-03-11

## 2025-03-06 RX ORDER — METHOCARBAMOL 500 MG/1
500 TABLET, FILM COATED ORAL 2 TIMES DAILY
Qty: 20 TABLET | Refills: 0 | Status: SHIPPED | OUTPATIENT
Start: 2025-03-06

## 2025-03-06 RX ORDER — KETOROLAC TROMETHAMINE 30 MG/ML
30 INJECTION, SOLUTION INTRAMUSCULAR; INTRAVENOUS ONCE
Status: COMPLETED | OUTPATIENT
Start: 2025-03-06 | End: 2025-03-06

## 2025-03-06 RX ADMIN — KETOROLAC TROMETHAMINE 30 MG: 30 INJECTION, SOLUTION INTRAMUSCULAR; INTRAVENOUS at 18:02

## 2025-03-06 NOTE — DISCHARGE INSTRUCTIONS
Take the Toradol 4 times daily for the next 5 days.    Use the Robaxin as needed for muscle spasm.    Use an electric heating pad over your sore muscles, 4-5 times daily, 20 minutes each time.    Make sure to drink plenty of fluids.    An order for [facility] has been placed, they should be in contact with you. If you have not heard from them in 48 hours, call the number included in these discharge instructions.

## 2025-03-06 NOTE — ED PROVIDER NOTES
Time reflects when diagnosis was documented in both MDM as applicable and the Disposition within this note       Time User Action Codes Description Comment    3/6/2025  6:14 PM Eulalio Rivero Add [M54.50,  G89.29] Acute exacerbation of chronic low back pain           ED Disposition       ED Disposition   Discharge    Condition   Stable    Date/Time   Thu Mar 6, 2025  6:14 PM    Comment   Calvin Conrad discharge to home/self care.                   Assessment & Plan       Medical Decision Making  1. Back pain - Patient with similar in the past, he has no red flag symptoms but states the pain does seem to radiate to the testicles/groin. Will CT abdomen and pelvis to rule out concomitant kidney stone. Will give Toradol IM.    Problems Addressed:  Acute exacerbation of chronic low back pain: chronic illness or injury with exacerbation, progression, or side effects of treatment    Amount and/or Complexity of Data Reviewed  Radiology: ordered.    Risk  Prescription drug management.        ED Course as of 03/07/25 1219   Thu Mar 06, 2025   1814 Did offer trigger point injection. Patient has declined this. Will place referral for [facility]       Medications   ketorolac (TORADOL) injection 30 mg (30 mg Intramuscular Given 3/6/25 1802)       ED Risk Strat Scores                            SBIRT 22yo+      Flowsheet Row Most Recent Value   Initial Alcohol Screen: US AUDIT-C     1. How often do you have a drink containing alcohol? 0 Filed at: 03/06/2025 1704   2. How many drinks containing alcohol do you have on a typical day you are drinking?  0 Filed at: 03/06/2025 1704   3a. Male UNDER 65: How often do you have five or more drinks on one occasion? 0 Filed at: 03/06/2025 1704   3b. FEMALE Any Age, or MALE 65+: How often do you have 4 or more drinks on one occassion? 0 Filed at: 03/06/2025 1704   Audit-C Score 0 Filed at: 03/06/2025 1704   DEDRICK: How many times in the past year have you...    Used an illegal drug  or used a prescription medication for non-medical reasons? Never Filed at: 03/06/2025 9173                            History of Present Illness       Chief Complaint   Patient presents with    Back Pain     Left back pain started yesterday. Today pain goes down left legs and into both testicles.       Past Medical History:   Diagnosis Date    Cataract     right eye    Hypertension       History reviewed. No pertinent surgical history.   History reviewed. No pertinent family history.   Social History     Tobacco Use    Smoking status: Never    Smokeless tobacco: Never   Vaping Use    Vaping status: Every Day   Substance Use Topics    Alcohol use: Yes     Comment: occ    Drug use: Yes     Types: Marijuana      E-Cigarette/Vaping    E-Cigarette Use Current Every Day User       E-Cigarette/Vaping Substances      I have reviewed and agree with the history as documented.     24 YO male presents with Left sided back pain for the last 5 days. States this did occur after physical activity. The pain radiates down the Left leg and into the B/L testicles; he denies testicular pain however. Patient has had similar pain in the past, typically in the Left. He has been taking medications as home without alleviation of his discomfort. He has never seen physical therapy for this. Pt denies CP/SOB/F/C/N/V/D/C, no dysuria, burning on urination or blood in urine.       History provided by:  Patient   used: No        Review of Systems   Constitutional:  Negative for fever.   HENT:  Negative for dental problem.    Eyes:  Negative for visual disturbance.   Respiratory:  Negative for shortness of breath.    Cardiovascular:  Negative for chest pain.   Gastrointestinal:  Negative for abdominal pain, nausea and vomiting.   Genitourinary:  Negative for dysuria and frequency.   Musculoskeletal:  Positive for back pain. Negative for neck pain and neck stiffness.   Skin:  Negative for rash.   Neurological:  Negative for  dizziness, weakness and light-headedness.   Psychiatric/Behavioral:  Negative for agitation, behavioral problems and confusion.    All other systems reviewed and are negative.          Objective       ED Triage Vitals [03/06/25 1642]   Temperature Pulse Blood Pressure Respirations SpO2 Patient Position - Orthostatic VS   97.6 °F (36.4 °C) 60 167/75 18 98 % Sitting      Temp Source Heart Rate Source BP Location FiO2 (%) Pain Score    Oral Monitor Right arm -- 6      Vitals      Date and Time Temp Pulse SpO2 Resp BP Pain Score FACES Pain Rating User   03/06/25 1802 -- -- -- -- -- 6 -- RIGOBERTO   03/06/25 1642 97.6 °F (36.4 °C) 60 98 % 18 167/75 6 -- RS            Physical Exam  Vitals and nursing note reviewed.   Constitutional:       Appearance: He is well-developed.   HENT:      Head: Normocephalic and atraumatic.   Eyes:      Extraocular Movements: Extraocular movements intact.   Cardiovascular:      Rate and Rhythm: Normal rate.   Pulmonary:      Effort: Pulmonary effort is normal.   Abdominal:      General: There is no distension.   Musculoskeletal:         General: Normal range of motion.      Cervical back: Normal range of motion.      Comments: Tender to palpation in the Left lower back, area of hypertrophic musculature noted.    Skin:     Findings: No rash.   Neurological:      Mental Status: He is alert and oriented to person, place, and time.   Psychiatric:         Behavior: Behavior normal.         Results Reviewed       Procedure Component Value Units Date/Time    Urine Microscopic [[id number]]  (Abnormal) Collected: 03/06/25 1733    Lab Status: Final result Specimen: Urine, Clean Catch Updated: 03/06/25 1748     RBC, UA 20-30 /hpf      WBC, UA 1-2 /hpf      Epithelial Cells Occasional /hpf      Bacteria, UA Occasional /hpf      MUCUS THREADS Innumerable    Urine Macroscopic, POC [[id number]]  (Abnormal) Collected: 03/06/25 1733    Lab Status: Final result Specimen: Urine Updated: 03/06/25 1734     Color, UA  Yellow     Clarity, UA Clear     pH, UA 5.5     Leukocytes, UA Negative     Nitrite, UA Negative     Protein, UA Negative mg/dl      Glucose, UA Negative mg/dl      Ketones, UA Trace mg/dl      Urobilinogen, UA 0.2 E.U./dl      Bilirubin, UA Negative     Occult Blood, UA Large     Specific Gravity, UA >=1.030    Narrative:      CL[facility] RESULT            CT renal stone study abdomen pelvis without contrast   Final Interpretation by Edward Flower MD (03/06 1809)      No interval change. No evidence for obstructive uropathy. Normal caliber appendix.         Workstation performed: [id number]             Procedures    ED Medication and Procedure Management   Prior to Admission Medications   Prescriptions Last Dose Informant Patient Reported? Taking?   acetaminophen (TYLENOL) 500 mg tablet  Self No No   Sig: Take 1 tablet (500 mg total) by mouth every 6 (six) hours as needed for mild pain   Patient not taking: Reported on 9/22/2023   methocarbamol (ROBAXIN) 500 mg tablet  Self No No   Sig: Take 1 tablet (500 mg total) by mouth 2 (two) times a day   Patient not taking: Reported on 9/22/2023   naproxen (NAPROSYN) 500 mg tablet   No No   Sig: Take 1 tablet (500 mg total) by mouth 2 (two) times a day with meals for 5 days   naproxen (Naprosyn) 500 mg tablet  Self No No   Sig: Take 1 tablet (500 mg total) by mouth 2 (two) times a day with meals   Patient not taking: Reported on 9/22/2023      Facility-Administered Medications: None     Discharge Medication List as of 3/6/2025  6:17 PM        START taking these medications    Details   ketorolac (TORADOL) 10 mg tablet Take 1 tablet (10 mg total) by mouth every 6 (six) hours as needed for moderate pain for up to 5 days, Starting Thu 3/6/2025, Until Tue 3/11/2025 at 2359, Normal      !! methocarbamol (ROBAXIN) 500 mg tablet Take 1 tablet (500 mg total) by mouth 2 (two) times a day, Starting Thu 3/6/2025, Normal       !! - Potential duplicate medications found. Please discuss with  provider.        CONTINUE these medications which have NOT CHANGED    Details   acetaminophen (TYLENOL) 500 mg tablet Take 1 tablet (500 mg total) by mouth every 6 (six) hours as needed for mild pain, Starting Sun 7/30/2023, Normal      !! methocarbamol (ROBAXIN) 500 mg tablet Take 1 tablet (500 mg total) by mouth 2 (two) times a day, Starting Sun 7/30/2023, Normal      naproxen (Naprosyn) 500 mg tablet Take 1 tablet (500 mg total) by mouth 2 (two) times a day with meals, Starting Sun 7/30/2023, Normal       !! - Potential duplicate medications found. Please discuss with provider.          ED SEPSIS DOCUMENTATION   Time reflects when diagnosis was documented in both MDM as applicable and the Disposition within this note       Time User Action Codes Description Comment    3/6/2025  6:14 PM Eulalio Rivero Add [M54.50,  G89.29] Acute exacerbation of chronic low back pain                  Eulalio Rivero MD  03/07/25 8745

## 2025-03-07 ENCOUNTER — NURSE TRIAGE (OUTPATIENT)
Dept: PHYSICAL THERAPY | Facility: OTHER | Age: 25
End: 2025-03-07

## 2025-03-07 DIAGNOSIS — G89.29 ACUTE EXACERBATION OF CHRONIC LOW BACK PAIN: ICD-10-CM

## 2025-03-07 DIAGNOSIS — M54.50 ACUTE LEFT-SIDED LOW BACK PAIN, UNSPECIFIED WHETHER SCIATICA PRESENT: Primary | ICD-10-CM

## 2025-03-07 DIAGNOSIS — M54.50 ACUTE EXACERBATION OF CHRONIC LOW BACK PAIN: ICD-10-CM

## 2025-03-07 NOTE — TELEPHONE ENCOUNTER
Additional Information   Negative: Is this related to a work injury?   Negative: Is this related to an MVA?   Negative: Are you currently recieving homecare services?    Background - Initial Assessment  Clinical complaint: Pain is left low back radiates down to bilat groin and down left leg to the knee. No numbness or tingling. Started 3/5/25. No injury or trauma. No prior back SX. Pt is established with PM/Dr Up. Had consult 9/22/2023. Was to do PT then and have a follow up in 2 months. Never did the PT. Never followed up. Pain is worse with sitting and feels better laying. Seen in ED 3/6/25. Pain is constant and feels pressure, tight, tense and shocking.   Date of onset: 3/5/25  Frequency of pain: constant  Quality of pain: pressure, tight, tense and shocking    Protocols used: [facility] Protocol

## 2025-03-07 NOTE — TELEPHONE ENCOUNTER
Additional Information   Negative: Has the patient had unexplained weight loss?   Negative: Does the patient have a fever?   Negative: Is the patient experiencing urine retention?   Negative: Is the patient experiencing blood in sputum?   Negative: Has the patient experienced major trauma? (fall from height, high speed collision, direct blow to spine) and is also experiencing nausea, light-headedness, or loss of consciousness?   Negative: Is the patient experiencing acute drop foot or paralysis?   Negative: Is this a chronic condition?    Protocols used: [facility] Protocol  Nurse completed the triage and NO RF s/s were present.  Referral entered for the [facility] site and the contact/phone number information for the office was given to the patient as well.   Patients information was sent to the preferred site and pt was made aware that clerical would be calling to schedule the evaluation appointment. Nurse encouraged him to call the site if he does not hear from clerical beforehand. Patient Agreed.    Patient did not voice any additional questions or concerns at this time.   Patient is aware current/past complaints,  any relevant dx, additional referrals and treatment/options will be discussed at the time of his evaluation/consultation appointment.   Patient is in agreement with plan.    Patient was very appreciative of the f/u Call and referral placement.     Nurse wished him well and the CS referral was closed.

## 2025-03-12 ENCOUNTER — EVALUATION (OUTPATIENT)
Dept: PHYSICAL THERAPY | Facility: CLINIC | Age: 25
End: 2025-03-12
Payer: MEDICARE

## 2025-03-12 VITALS — SYSTOLIC BLOOD PRESSURE: 130 MMHG | DIASTOLIC BLOOD PRESSURE: 68 MMHG

## 2025-03-12 DIAGNOSIS — M54.50 ACUTE LEFT-SIDED LOW BACK PAIN, UNSPECIFIED WHETHER SCIATICA PRESENT: ICD-10-CM

## 2025-03-12 DIAGNOSIS — M54.50 ACUTE EXACERBATION OF CHRONIC LOW BACK PAIN: ICD-10-CM

## 2025-03-12 DIAGNOSIS — G89.29 ACUTE EXACERBATION OF CHRONIC LOW BACK PAIN: ICD-10-CM

## 2025-03-12 PROCEDURE — 97161 PT EVAL LOW COMPLEX 20 MIN: CPT

## 2025-03-12 PROCEDURE — 97110 THERAPEUTIC EXERCISES: CPT

## 2025-03-12 PROCEDURE — 97112 NEUROMUSCULAR REEDUCATION: CPT

## 2025-03-12 NOTE — PROGRESS NOTES
PT Evaluation     Today's date: 3/12/2025  Patient name: Calvin Conrad  : 2000  MRN: [id number]  Referring provider: Ramone Vanessa PT  Dx:   Encounter Diagnosis     ICD-10-CM    1. Acute left-sided low back pain, unspecified whether sciatica present  M54.50 Ambulatory referral to PT spine      2. Acute exacerbation of chronic low back pain  M54.50 Ambulatory referral to PT spine    G89.29           Start Time: 1530          Assessment  Impairments: abnormal or restricted ROM, impaired physical strength, pain with function and poor posture   Symptom irritability: high    Assessment details: Calvin Conrad  is a pleasant 25 y.o.male who presents to therapy for LBP/ radicular pain via comp spine as moderate risk. Presents with increased L lat flexion restriction, posterior chain and QL restriction and potential R lat shift. Neurological testing WNL. Slump and SLR (+) Increased hip flexor restriction and tenderness. LLD noted. Heel lift provided with instructions on wearing schedule. HEP provided with focus on L sided mobility to tolerance. Educated on hip and low back positioning to reduce increased muscle compensation and spasming.     Problem List:  1) posterior chain restriction   2) decreased core/ LE activation   3) LLD with lat shift     Tolerated session without adverse effects.  Pt. will benefit from skilled PT services that includes manual therapy techniques to enhance tissue extensibility, neuromuscular re-education to facilitate motor control, therapeutic exercise to increase functional mobility, and modalities prn to reduce pain and inflammation.   Understanding of Dx/Px/POC: good     Prognosis: good    Goals  Short Term Goals: to be achieved by 4 weeks  1) Patient to be independent with basic HEP.  2) Decrease pain to 3/10 at its worst.  3) Increase lumbar spine ROM by 50% in all deficient planes.   4) Increase LE strength by 1/2 MMT grade in all deficient planes.  5) Patient to report  decreased sleep interruption secondary to pain.  6) Increase standing/ walking tolerance to > 60 minutes without increased pain     Long Term Goals: to be achieved by discharge  1) FOTO equal to or greater than .  2) Patient to be independent with comprehensive HEP.  3) Abolish pain for improved quality of life.  4) Lumbar spine ROM WNL all planes to improve a/iadls.  5) Increase LE strength to 5/5 MMT grade in all planes to improve a/iadls.  6) Patient to be able to bend/ lift and stand as required for work without limitation by d/c   6) Increase standing/ walking tolerance to > 90 minutes without increased pain     Plan  Patient would benefit from: skilled physical therapy and PT eval  Referral necessary: No  Planned modality interventions: thermotherapy: hydrocollator packs and cryotherapy    Planned therapy interventions: IASTM, joint mobilization, kinesiology taping, manual therapy, nerve gliding, neuromuscular re-education, patient/caregiver education, postural training, strengthening, stretching, therapeutic activities, therapeutic exercise and home exercise program    Frequency: 2x week  Treatment plan discussed with: patient  Plan details: 8-10 visits as per start back       Subjective Evaluation    History of Present Illness  Mechanism of injury: Calvin Conrad presents with c/c of LBP . Symptoms began 2 weeks ago with no known mechanism of injury. Notes onset of pain in the L low back into the ant knee after a walk. Numbness in the ant thigh with sitting > 5 minutes. Denies weakness in the LE. Denies change in bowel/ bladder function. Ambulates with antalgic pattern. Denies hx of back injury. Notes previous LBP in 2023.   Aggravating factors: numbness with sitting, lifting leg to the side, reaching down, standing for prolonged periods   Relieving factors: has not tried ice or heat. 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 Negative for bladder dysfunction, bowel dysfunction and saddle (S4) numbness    Additional Special Questions  \" Cramping into the balls\"    Postural Observations  Seated posture: poor  Standing posture: poor    Additional Postural Observation Details  R lat shift, decreased L WB    Palpation     Additional Palpation Details  Glute atrophy B no tenderness  L QL hypertrophy and increased tenderness with referral into the ant thigh on the L     Hip flexor tenderness and referral into the thigh     Neurological Testing     Sensation     Lumbar   Left   Intact: light touch    Right   Intact: light touch    Comments   Left light touch: L2-S1  Right light touch: L2-S1    Reflexes   Left   Patellar (L4): normal (2+)  Achilles (S1): normal (2+)  Babinski sign: negative  Clonus sign: negative    Right   Patellar (L4): normal (2+)  Achilles (S1): normal (2+)  Babinski sign: negative  Clonus sign: negative    Active Range of Motion     Lumbar   Flexion: Active lumbar flexion: pulling on the L.  WFL and with pain  Extension: Active lumbar extension: pain in the low back and ant hip.  with pain Restriction level: maximal  Left lateral flexion:  with pain Restriction level: maximal  Right lateral flexion:  Restriction level: minimal  Left rotation:  WFL  Right rotation:  WFL    Additional Active Range of Motion Details  Gowers maneuver to stand     Joint Play   Joints within functional limits: T12, L1, L2, L4 and L5     Hypermobile: L2 and L3     Pain: L3 and S1 " "    Strength/Myotome Testing     Lumbar   Left   Heel walk: normal  Toe walk: abnormal    Right   Heel walk: normal  Toe walk: normal    Left Hip   Planes of Motion   Flexion: 4+  Extension: 4-  Abduction: 4-  Adduction: 4  External rotation: 4  Internal rotation: 4    Right Hip   Planes of Motion   Flexion: 4+  Extension: 4-  Abduction: 4-  Adduction: 4  External rotation: 4  Internal rotation: 4    Left Knee   Flexion: 4+  Extension: 4    Right Knee   Flexion: 4+  Extension: 4+    Left Ankle/Foot   Dorsiflexion: 5  Plantar flexion: 5    Right Ankle/Foot   Dorsiflexion: 5  Plantar flexion: 5    Muscle Activation   Patient able to activate left transverse abdominals, left multifidus, right transverse abdominals and right multifidus.     Tests     Lumbar   Positive prone instability .     Left   Positive crossed SLR.   Negative passive SLR, quadrant and slump test.     Right   Positive passive SLR, quadrant and slump test.   Negative crossed SLR.     Left Pelvic Girdle/Sacrum   Positive: active SLR test.     Right Pelvic Girdle/Sacrum   Positive: active SLR test.     Right Hip   Positive ERIK and FADIR.     Additional Tests Details  Increased restriction in post glute/ LE with movement   Ely (+)     R 28 cm L 39 cm                Precautions: no remarkable pmh       Manuals 3/12            MFD To L QL  KR            TP lat glude              Heel lift trial  R LE kR                         Neuro Re-Ed 3/12            LTR to R (HEP) 5x10\"            Lat shift correction 10 L on wall             TA contraction              Sciatic nerve glide                                                     Ther Ex 3/12            Piriformis stretch away (HEP) 3x30\"             QL stretch (HEP) 3x30\"             Pball flex              Hamstring stretch                                                                  Ther Activity 3/12            Bike              Patient education  DUSTIN COVARRUBIAS      "        Modalities 3/12            HP prn  Xupine x 10' post

## 2025-03-12 NOTE — LETTER
2025    Aimee Saldivar DO  [address] [facility] [address]    Patient: Calvin Conrad  YOB: 2000   Date of Visit: 3/12/2025      Dear Dr. Saldivar:    One of your patients, Calvin Conrad, was referred to me by the [facility] program.  Please see the evaluation summary attached below.  If care is required beyond 30 days to help your patient reach their goals, I will send you a request for signature on the plan of care.    If you have any questions or concerns, please don't hesitate to call.      Sincerely,    Monique Shin, PT      Primary Care Provider:      I certify that I have read the below Plan of Care and certify the need for these services furnished under this plan of treatment while under my care.                    Aimee SaldivarDO  [address] [facility] [address]  Via Fax: [phone]           PT Evaluation     Today's date: 3/12/2025  Patient name: Calvin Conrad  : 2000  MRN: [id number]  Referring provider: Ramone Vanessa, ZOILA  Dx:   Encounter Diagnosis     ICD-10-CM    1. Acute left-sided low back pain, unspecified whether sciatica present  M54.50 Ambulatory referral to PT spine      2. Acute exacerbation of chronic low back pain  M54.50 Ambulatory referral to PT spine    G89.29           Start Time: 1530          Assessment  Impairments: abnormal or restricted ROM, impaired physical strength, pain with function and poor posture   Symptom irritability: high    Assessment details: Calvin Conrad  is a pleasant 25 y.o.male who presents to therapy for LBP/ radicular pain via comp spine as moderate risk. Presents with increased L lat flexion restriction, posterior chain and QL restriction and potential R lat shift. Neurological testing WNL. Slump and SLR (+) Increased hip flexor restriction and tenderness. LLD noted. Heel lift provided with instructions on wearing schedule. HEP provided with focus on L sided mobility to tolerance.  Educated on hip and low back positioning to reduce increased muscle compensation and spasming.     Problem List:  1) posterior chain restriction   2) decreased core/ LE activation   3) LLD with lat shift     Tolerated session without adverse effects.  Pt. will benefit from skilled PT services that includes manual therapy techniques to enhance tissue extensibility, neuromuscular re-education to facilitate motor control, therapeutic exercise to increase functional mobility, and modalities prn to reduce pain and inflammation.   Understanding of Dx/Px/POC: good     Prognosis: good    Goals  Short Term Goals: to be achieved by 4 weeks  1) Patient to be independent with basic HEP.  2) Decrease pain to 3/10 at its worst.  3) Increase lumbar spine ROM by 50% in all deficient planes.   4) Increase LE strength by 1/2 MMT grade in all deficient planes.  5) Patient to report decreased sleep interruption secondary to pain.  6) Increase standing/ walking tolerance to > 60 minutes without increased pain     Long Term Goals: to be achieved by discharge  1) FOTO equal to or greater than .  2) Patient to be independent with comprehensive HEP.  3) Abolish pain for improved quality of life.  4) Lumbar spine ROM WNL all planes to improve a/iadls.  5) Increase LE strength to 5/5 MMT grade in all planes to improve a/iadls.  6) Patient to be able to bend/ lift and stand as required for work without limitation by d/c   6) Increase standing/ walking tolerance to > 90 minutes without increased pain     Plan  Patient would benefit from: skilled physical therapy and PT eval  Referral necessary: No  Planned modality interventions: thermotherapy: hydrocollator packs and cryotherapy    Planned therapy interventions: IASTM, joint mobilization, kinesiology taping, manual therapy, nerve gliding, neuromuscular re-education, patient/caregiver education, postural training, strengthening, stretching, therapeutic activities, therapeutic exercise and home  "exercise program    Frequency: 2x week  Treatment plan discussed with: patient  Plan details: 8-10 visits as per start back       Subjective Evaluation    History of Present Illness  Mechanism of injury: Calvin Conrad presents with c/c of LBP . Symptoms began 2 weeks ago with no known mechanism of injury. Notes onset of pain in the L low back into the ant knee after a walk. Numbness in the ant thigh with sitting > 5 minutes. Denies weakness in the LE. Denies change in bowel/ bladder function. Ambulates with antalgic pattern. Denies hx of back injury. Notes previous LBP in 2023.   Aggravating factors: numbness with sitting, lifting leg to the side, reaching down, standing for prolonged periods   Relieving factors: has not tried ice or heat. 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 Negative for bladder dysfunction, bowel dysfunction and saddle (S4) numbness    Additional Special Questions  \" Cramping into the balls\"    Postural Observations  Seated posture: poor  Standing posture: poor    Additional Postural Observation Details  R lat shift, decreased L WB    Palpation     Additional Palpation Details  Glute atrophy B no tenderness  L QL hypertrophy and increased tenderness with referral into the ant thigh on the L     Hip flexor tenderness and referral into the thigh     Neurological Testing     Sensation     Lumbar   Left   Intact: light touch    Right   Intact: light " touch    Comments   Left light touch: L2-S1  Right light touch: L2-S1    Reflexes   Left   Patellar (L4): normal (2+)  Achilles (S1): normal (2+)  Babinski sign: negative  Clonus sign: negative    Right   Patellar (L4): normal (2+)  Achilles (S1): normal (2+)  Babinski sign: negative  Clonus sign: negative    Active Range of Motion     Lumbar   Flexion: Active lumbar flexion: pulling on the L.  WFL and with pain  Extension: Active lumbar extension: pain in the low back and ant hip.  with pain Restriction level: maximal  Left lateral flexion:  with pain Restriction level: maximal  Right lateral flexion:  Restriction level: minimal  Left rotation:  WFL  Right rotation:  WFL    Additional Active Range of Motion Details  Gowers maneuver to stand     Joint Play   Joints within functional limits: T12, L1, L2, L4 and L5     Hypermobile: L2 and L3     Pain: L3 and S1     Strength/Myotome Testing     Lumbar   Left   Heel walk: normal  Toe walk: abnormal    Right   Heel walk: normal  Toe walk: normal    Left Hip   Planes of Motion   Flexion: 4+  Extension: 4-  Abduction: 4-  Adduction: 4  External rotation: 4  Internal rotation: 4    Right Hip   Planes of Motion   Flexion: 4+  Extension: 4-  Abduction: 4-  Adduction: 4  External rotation: 4  Internal rotation: 4    Left Knee   Flexion: 4+  Extension: 4    Right Knee   Flexion: 4+  Extension: 4+    Left Ankle/Foot   Dorsiflexion: 5  Plantar flexion: 5    Right Ankle/Foot   Dorsiflexion: 5  Plantar flexion: 5    Muscle Activation   Patient able to activate left transverse abdominals, left multifidus, right transverse abdominals and right multifidus.     Tests     Lumbar   Positive prone instability .     Left   Positive crossed SLR.   Negative passive SLR, quadrant and slump test.     Right   Positive passive SLR, quadrant and slump test.   Negative crossed SLR.     Left Pelvic Girdle/Sacrum   Positive: active SLR test.     Right Pelvic Girdle/Sacrum   Positive: active SLR  "test.     Right Hip   Positive ERIK and FADIR.     Additional Tests Details  Increased restriction in post glute/ LE with movement   Ely (+)     R 28 cm L 39 cm                Precautions: no remarkable pmh       Manuals 3/12            MFD To L QL  KR            TP lat glude              Heel lift trial  R LE kR                         Neuro Re-Ed 3/12            LTR to R (HEP) 5x10\"            Lat shift correction 10 L on wall             TA contraction              Sciatic nerve glide                                                     Ther Ex 3/12            Piriformis stretch away (HEP) 3x30\"             QL stretch (HEP) 3x30\"             Pball flex              Hamstring stretch                                                                  Ther Activity 3/12            Bike              Patient education  KR                                      FOTO             Modalities 3/12            HP prn  Xupine x 10' post                               "

## 2025-03-21 ENCOUNTER — OFFICE VISIT (OUTPATIENT)
Dept: PHYSICAL THERAPY | Facility: CLINIC | Age: 25
End: 2025-03-21
Payer: MEDICARE

## 2025-03-21 DIAGNOSIS — M54.50 ACUTE LEFT-SIDED LOW BACK PAIN, UNSPECIFIED WHETHER SCIATICA PRESENT: Primary | ICD-10-CM

## 2025-03-21 DIAGNOSIS — G89.29 ACUTE EXACERBATION OF CHRONIC LOW BACK PAIN: ICD-10-CM

## 2025-03-21 DIAGNOSIS — M54.50 ACUTE EXACERBATION OF CHRONIC LOW BACK PAIN: ICD-10-CM

## 2025-03-21 PROCEDURE — 97110 THERAPEUTIC EXERCISES: CPT

## 2025-03-21 PROCEDURE — 97112 NEUROMUSCULAR REEDUCATION: CPT

## 2025-03-21 PROCEDURE — 97530 THERAPEUTIC ACTIVITIES: CPT

## 2025-03-21 NOTE — PROGRESS NOTES
"Daily Note     Today's date: 3/21/2025  Patient name: Calvin Conrad  : 2000  MRN: [id number]  Referring provider: Ramone Vanessa, ZOILA  Dx:   Encounter Diagnosis     ICD-10-CM    1. Acute left-sided low back pain, unspecified whether sciatica present  M54.50       2. Acute exacerbation of chronic low back pain  M54.50     G89.29                      Subjective: Pt presents to PT reporting less pain and increased movement stating \"dressing is better\".  Pt reports pain in low back pre TE grading the pain a 5/10.  Pt reports good response with heel lift stating he notes less pain in anterior hip when he wears the lift.       Objective: See treatment diary below      Assessment: Pt demonstrates good tolerance to progressions with no complaints.  Pt continues to work toward PT goals.  Finished with MHP in supine post PT session. Patient demonstrated fatigue post treatment, exhibited good technique with therapeutic exercises, and would benefit from continued PT to increase flexibility, strength and function.      Plan: Continue per plan of care.      Precautions: no remarkable pmh       Manuals 3/12 3/21           MFD To L QL  KR            TP lat glude              Heel lift trial  R LE kR                         Neuro Re-Ed 3/12 3/21           LTR to R (HEP) 5x10\" 5 x 10\"           Lat shift correction 10 L on wall  10 L on wall            TA contraction   5\" x15           Sciatic nerve glide   15x ea                                                  Ther Ex 3/12 3/21           Piriformis stretch away (HEP) 3x30\"  30\" x 3           QL stretch (HEP) 3x30\"  3x30\"            Pball flex   10\" x 10           Hamstring stretch   30\" x 3                                                               Ther Activity 3/12 3/21           Bike   6'           Patient education  KR PK                                     FOTO             Modalities 3/12 3/21           HP prn  supine x 10' post  supine x 10' post             "

## 2025-03-24 ENCOUNTER — OFFICE VISIT (OUTPATIENT)
Dept: PHYSICAL THERAPY | Facility: CLINIC | Age: 25
End: 2025-03-24
Payer: MEDICARE

## 2025-03-24 DIAGNOSIS — G89.29 ACUTE EXACERBATION OF CHRONIC LOW BACK PAIN: ICD-10-CM

## 2025-03-24 DIAGNOSIS — M54.50 ACUTE LEFT-SIDED LOW BACK PAIN, UNSPECIFIED WHETHER SCIATICA PRESENT: Primary | ICD-10-CM

## 2025-03-24 DIAGNOSIS — M54.50 ACUTE EXACERBATION OF CHRONIC LOW BACK PAIN: ICD-10-CM

## 2025-03-24 PROCEDURE — 97110 THERAPEUTIC EXERCISES: CPT

## 2025-03-24 PROCEDURE — 97140 MANUAL THERAPY 1/> REGIONS: CPT

## 2025-03-24 PROCEDURE — 97112 NEUROMUSCULAR REEDUCATION: CPT

## 2025-03-24 NOTE — PROGRESS NOTES
"Daily Note     Today's date: 3/24/2025  Patient name: Calvin Conrad  : 2000  MRN: [id number]  Referring provider: Ramone Vanessa PT  Dx:   Encounter Diagnosis     ICD-10-CM    1. Acute left-sided low back pain, unspecified whether sciatica present  M54.50       2. Acute exacerbation of chronic low back pain  M54.50     G89.29                        Subjective: \" I have like a 5/10 pain in the ant hip- I felt better after last time\"       Objective: See treatment diary below    Access Code: [email or web address]  URL: [email or web address]  Date: 2025  Prepared by: Monique Shin    Exercises  - Supine Lower Trunk Rotation  - 1 x daily - 7 x weekly - 3 reps - 30 hold  - Supine Figure 4 Piriformis Stretch  - 1 x daily - 7 x weekly - 3 reps - 30 hold  - Seated Quadratus Lumborum Stretch in Chair  - 1 x daily - 7 x weekly - 3 reps - 20 hold  - Modified Jorge Stretch  - 1 x daily - 7 x weekly - 3 reps - 30 hold  - Seated Sciatic Tensioner  - 1 x daily - 7 x weekly - 10 reps      Assessment: Calvin presents to therapy with LBP. Arrived with increased ant hip pain/ restriction. Added mobilizations to tolerance with improved mobility and reduced pain. Re educated  movement with reduced lumbar spine compensation with use of glute/ hip activation. Improved mobility post MFD/ STM release on the QL. Educated on scaitic nerve glides and use for neural restrction.  Tolerated session without adverse effects. Recommend continued skilled therapy to improve overall strength and mobility for functional return with decreased compensation and pain.        Plan: Continue per plan of care.      Precautions: no remarkable pmh       Manuals 3/12 3/21 3/24          MFD To L QL  KR  KR          TP lat glude              Heel lift trial  R LE kR            Hip mobs    Flex/ abd grade IV 3 x 30           Neuro Re-Ed 3/12 3/21 3/24          LTR to R (HEP) 5x10\" 5 x 10\" 5x10\"           Lat shift correction 10 L on wall  10 " "L on wall  10L on wall           TA contraction   5\" x15           Sciatic nerve glide   15x ea 15          Bridges    2x10                                     Ther Ex 3/12 3/21 3/24          Piriformis stretch away (HEP) 3x30\"  30\" x 3 3x30\"           QL stretch (HEP) 3x30\"  3x30\"            Pball flex   10\" x 10           Hamstring stretch   30\" x 3           Jorge stretch    Mod 3 x 30\"           ITB stretch    Standing 3x20\"                                     Ther Activity 3/12 3/21 3/24          Bike   6' 5'           Patient education  KR PK KR                                    FOTO             Modalities 3/12 3/21 3/24          HP prn  supine x 10' post  supine x 10' post                                "

## 2025-03-26 ENCOUNTER — OFFICE VISIT (OUTPATIENT)
Dept: PHYSICAL THERAPY | Facility: CLINIC | Age: 25
End: 2025-03-26
Payer: MEDICARE

## 2025-03-26 DIAGNOSIS — M54.50 ACUTE LEFT-SIDED LOW BACK PAIN, UNSPECIFIED WHETHER SCIATICA PRESENT: Primary | ICD-10-CM

## 2025-03-26 DIAGNOSIS — G89.29 ACUTE EXACERBATION OF CHRONIC LOW BACK PAIN: ICD-10-CM

## 2025-03-26 DIAGNOSIS — M54.50 ACUTE EXACERBATION OF CHRONIC LOW BACK PAIN: ICD-10-CM

## 2025-03-26 PROCEDURE — 97140 MANUAL THERAPY 1/> REGIONS: CPT

## 2025-03-26 PROCEDURE — 97112 NEUROMUSCULAR REEDUCATION: CPT

## 2025-03-26 PROCEDURE — 97110 THERAPEUTIC EXERCISES: CPT

## 2025-03-26 NOTE — PROGRESS NOTES
"Daily Note     Today's date: 3/26/2025  Patient name: Calvin Conrad  : 2000  MRN: [id number]  Referring provider: Ramone Vanessa, ZOILA  Dx:   Encounter Diagnosis     ICD-10-CM    1. Acute left-sided low back pain, unspecified whether sciatica present  M54.50       2. Acute exacerbation of chronic low back pain  M54.50     G89.29                          Subjective: \" I am like a  3/10 right now-  I felt good after I left\"       Objective: See treatment diary below    Access Code: [email or web address]  URL: [email or web address]  Date: 2025  Prepared by: Monique Shin    Exercises  - Supine Lower Trunk Rotation  - 1 x daily - 7 x weekly - 3 reps - 30 hold  - Supine Figure 4 Piriformis Stretch  - 1 x daily - 7 x weekly - 3 reps - 30 hold  - Seated Quadratus Lumborum Stretch in Chair  - 1 x daily - 7 x weekly - 3 reps - 20 hold  - Modified Jorge Stretch  - 1 x daily - 7 x weekly - 3 reps - 30 hold  - Seated Sciatic Tensioner  - 1 x daily - 7 x weekly - 10 reps      Assessment: Calvin presents to therapy with LBP. Note improved mobility with hip mobs today with reduced hip flexion noted in supine. Continues to demonstrate increased QL restrictions on the R with visible inflammation post MFD.  Will makenzie assess back NV. Progressed hip ext as tolerated. Tolerated session without adverse effects. Recommend continued skilled therapy to improve overall strength and mobility for functional return with decreased compensation and pain.        Plan: Continue per plan of care.      Precautions: no remarkable pmh       Manuals 3/12 3/21 3/24 3/26         MFD To L QL  KR  KR KR         TP lat glude              Heel lift trial  R LE kR            Hip mobs    Flex/ abd grade IV 3 x 30  Flex/ abd grade IV 3 x 30          Neuro Re-Ed 3/12 3/21 3/24 3/26         LTR to R (HEP) 5x10\" 5 x 10\" 5x10\"  5x10\"          Lat shift correction 10 L on wall  10 L on wall  10L on wall  20 L on wall          TA contraction   5\" " "x15           Sciatic nerve glide   15x ea 15          Bridges    2x10  2x10                                   Ther Ex 3/12 3/21 3/24 3/26         Piriformis stretch away (HEP) 3x30\"  30\" x 3 3x30\"  3x30\"          QL stretch (HEP) 3x30\"  3x30\"            Pball flex   10\" x 10           Hamstring stretch   30\" x 3           Jorge stretch    Mod 3 x 30\"  Mod 3 x 30\"          ITB stretch    Standing 3x20\"  Standing 3 x 20\"                                    Ther Activity 3/12 3/21 3/24 3/26         Bike   6' 5'  6'         Patient education  KR PK KR KR                                    FOTO             Modalities 3/12 3/21 3/24          HP prn  supine x 10' post  supine x 10' post                                "